# Patient Record
Sex: FEMALE | Employment: UNEMPLOYED | URBAN - METROPOLITAN AREA
[De-identification: names, ages, dates, MRNs, and addresses within clinical notes are randomized per-mention and may not be internally consistent; named-entity substitution may affect disease eponyms.]

---

## 2017-07-07 LAB — PAP-ABSTRACT: NORMAL

## 2018-11-07 ENCOUNTER — OFFICE VISIT (OUTPATIENT)
Dept: FAMILY MEDICINE | Facility: CLINIC | Age: 28
End: 2018-11-07
Payer: COMMERCIAL

## 2018-11-07 VITALS
HEART RATE: 80 BPM | SYSTOLIC BLOOD PRESSURE: 124 MMHG | TEMPERATURE: 98 F | DIASTOLIC BLOOD PRESSURE: 72 MMHG | HEIGHT: 61 IN | WEIGHT: 168 LBS | BODY MASS INDEX: 31.72 KG/M2 | RESPIRATION RATE: 18 BRPM

## 2018-11-07 DIAGNOSIS — F43.23 ADJUSTMENT DISORDER WITH MIXED ANXIETY AND DEPRESSED MOOD: ICD-10-CM

## 2018-11-07 DIAGNOSIS — Z30.41 ENCOUNTER FOR SURVEILLANCE OF CONTRACEPTIVE PILLS: ICD-10-CM

## 2018-11-07 DIAGNOSIS — Z00.00 ANNUAL PHYSICAL EXAM: Primary | ICD-10-CM

## 2018-11-07 PROCEDURE — 99395 PREV VISIT EST AGE 18-39: CPT | Performed by: NURSE PRACTITIONER

## 2018-11-07 RX ORDER — DESOGESTREL AND ETHINYL ESTRADIOL 0.15-0.03
1 KIT ORAL DAILY
COMMUNITY
End: 2018-11-07

## 2018-11-07 RX ORDER — DESOGESTREL AND ETHINYL ESTRADIOL 0.15-0.03
1 KIT ORAL DAILY
Qty: 84 TABLET | Refills: 3 | Status: SHIPPED | OUTPATIENT
Start: 2018-11-07 | End: 2019-10-11

## 2018-11-07 RX ORDER — FLUOXETINE 20 MG/1
20 TABLET, FILM COATED ORAL DAILY
COMMUNITY
End: 2019-03-22

## 2018-11-07 RX ORDER — ARIPIPRAZOLE 5 MG/1
5 TABLET ORAL DAILY
COMMUNITY
End: 2019-03-22

## 2018-11-07 ASSESSMENT — ENCOUNTER SYMPTOMS
DIZZINESS: 0
COUGH: 1
ABDOMINAL PAIN: 1
CONSTIPATION: 0
DIARRHEA: 0
HEMATURIA: 0
EYE PAIN: 0
CHILLS: 0
HEMATOCHEZIA: 1

## 2018-11-07 NOTE — NURSING NOTE
"Chief Complaint   Patient presents with     Physical       Initial /72 (BP Location: Right arm, Cuff Size: Adult Regular)  Pulse 80  Temp 98  F (36.7  C) (Tympanic)  Resp 18  Ht 5' 1\" (1.549 m)  Wt 168 lb (76.2 kg)  LMP 10/31/2018  BMI 31.74 kg/m2 Estimated body mass index is 31.74 kg/(m^2) as calculated from the following:    Height as of this encounter: 5' 1\" (1.549 m).    Weight as of this encounter: 168 lb (76.2 kg).    Patient presents to the clinic using No DME    Health Maintenance that is potentially due pending provider review:  Pap Smear    Pap done at [facility] on 7/7/17- abstracted.    Is there anyone who you would like to be able to receive your results? No  If yes have patient fill out RONALDO    "

## 2018-11-07 NOTE — MR AVS SNAPSHOT
After Visit Summary   11/7/2018    Danyell Orr    MRN: [id number]           Patient Information     Date Of Birth          1990        Visit Information        Provider Department      11/7/2018 10:40 AM Raissa Garibay APRN [facility]        Today's Diagnoses     Annual physical exam    -  1    Encounter for surveillance of contraceptive pills        Adjustment disorder with mixed anxiety and depressed mood          Care Instructions      Preventive Health Recommendations  Female Ages 26 - 39  Yearly exam:   See your health care provider every year in order to    Review health changes.     Discuss preventive care.      Review your medicines if you your doctor has prescribed any.    Until age 30: Get a Pap test every three years (more often if you have had an abnormal result).    After age 30: Talk to your doctor about whether you should have a Pap test every 3 years or have a Pap test with HPV screening every 5 years.   You do not need a Pap test if your uterus was removed (hysterectomy) and you have not had cancer.  You should be tested each year for STDs (sexually transmitted diseases), if you're at risk.   Talk to your provider about how often to have your cholesterol checked.  If you are at risk for diabetes, you should have a diabetes test (fasting glucose).  Shots: Get a flu shot each year. Get a tetanus shot every 10 years.   Nutrition:     Eat at least 5 servings of fruits and vegetables each day.    Eat whole-grain bread, whole-wheat pasta and brown rice instead of white grains and rice.    Get adequate Calcium and Vitamin D.     Lifestyle    Exercise at least 150 minutes a week (30 minutes a day, 5 days of the week). This will help you control your weight and prevent disease.    Limit alcohol to one drink per day.    No smoking.     Wear sunscreen to prevent skin cancer.    See your dentist every six months for an exam and cleaning.            Follow-ups  "after your visit        Additional Services     MENTAL HEALTH REFERRAL  - Child/Adolescent; Psychiatry and Medication Management; Psychiatry; [facility]: Psychiatry Clinic   [phone]; We will contact you to schedule the appointment or please call with any questions       All scheduling is subject to the client's specific insurance plan & benefits, provider/location availability, and provider clinical specialities.  Please arrive 15 minutes early for your first appointment and bring your completed paperwork.    Please be aware that coverage of these services is subject to the terms and limitations of your health insurance plan.  Call member services at your health plan with any benefit or coverage questions.                            Who to contact     If you have questions or need follow up information about today's clinic visit or your schedule please contact [facility] directly at [phone].  Normal or non-critical lab and imaging results will be communicated to you by [employer], letter or phone within 4 business days after the clinic has received the results. If you do not hear from us within 7 days, please contact the clinic through Dakwakh[employer] or phone. If you have a critical or abnormal lab result, we will notify you by phone as soon as possible.  Submit refill requests through [employer] or call your pharmacy and they will forward the refill request to us. Please allow 3 business days for your refill to be completed.          Additional Information About Your Visit        [employer] Information     [employer] lets you send messages to your doctor, view your test results, renew your prescriptions, schedule appointments and more. To sign up, go to [email or web address] . Click on \"Log in\" on the left side of the screen, which will take you to the Welcome page. Then click on \"Sign up Now\" on the right side of the page.     You will be asked to enter the access code listed below, as well as some " "personal information. Please follow the directions to create your username and password.     Your access code is: [email or web address]  Expires: 2019  9:23 AM     Your access code will  in 90 days. If you need help or a new code, please call your [facility] clinic or [phone].        Care EveryWhere ID     This is your Care EveryWhere ID. This could be used by other organizations to access your [facility] medical records  [id number]        Your Vitals Were     Pulse Temperature Respirations Height Last Period BMI (Body Mass Index)    80 98  F (36.7  C) (Tympanic) 18 5' 1\" (1.549 m) 10/31/2018 31.74 kg/m2       Blood Pressure from Last 3 Encounters:   18 124/72    Weight from Last 3 Encounters:   18 168 lb (76.2 kg)              We Performed the Following     ABSTRACT PAP-NO CHARGE     MENTAL HEALTH REFERRAL  - Child/Adolescent; Psychiatry and Medication Management; Psychiatry; [facility]: Psychiatry Clinic   [phone]; We will contact you to schedule the appointment or please call with any questions          Where to get your medicines      These medications were sent to [facility] [place], [place] - [address]  [address] [address]     Phone:  [phone]     desogestrel-ethinyl estradiol 0.15-30 MG-MCG per tablet          Primary Care Provider Office Phone # Fax #    Raissa ZBIGNIEW [facility] [phone] [address] [place] [address]        Equal Access to Services     OZZY CONNER : Hadii emil ham hadasho Sodebbieali, waaxda luqadaha, qaybta kaalmada adeegyavelma, radha murillo. So [facility] [phone].    ATENCIÓN: Si habla español, tiene a hale disposición servicios gratuitos de asistencia lingüística. Llame al [phone].    We comply with applicable federal civil rights laws and [place] laws. We do not discriminate on the basis of race, color, national origin, age, disability, sex, sexual orientation, or gender " identity.            Thank you!     Thank you for choosing [facility]  for your care. Our goal is always to provide you with excellent care. Hearing back from our patients is one way we can continue to improve our services. Please take a few minutes to complete the written survey that you may receive in the mail after your visit with us. Thank you!             Your Updated Medication List - Protect others around you: Learn how to safely use, store and throw away your medicines at www.disposemymeds.org.          This list is accurate as of 11/7/18 11:04 AM.  Always use your most recent med list.                   Brand Name Dispense Instructions for use Diagnosis    AMPHETAMINE SALT COMBO PO      Take 20 mg by mouth daily Take two tablets daily        ARIPiprazole 5 MG tablet    ABILIFY     Take 5 mg by mouth daily        desogestrel-ethinyl estradiol 0.15-30 MG-MCG per tablet    JULEBER    84 tablet    Take 1 tablet by mouth daily    Encounter for surveillance of contraceptive pills       FLUoxetine 20 MG tablet      Take 20 mg by mouth daily

## 2018-11-07 NOTE — PROGRESS NOTES
SUBJECTIVE:   CC: Danyell Orr is an 28 year old woman who presents for preventive health visit.     Physical   Annual:     Getting at least 3 servings of Calcium per day:  NO    Bi-annual eye exam:  NO    Dental care twice a year:  Yes    Sleep apnea or symptoms of sleep apnea:  None    Diet:  Regular (no restrictions)    Frequency of exercise:  1 day/week    Duration of exercise:  Less than 15 minutes    Taking medications regularly:  Yes    Medication side effects:  None    Additional concerns today:  No      Today's PHQ-2 Score:   PHQ-2 ( 1999 [employer]) 11/7/2018   Q1: Little interest or pleasure in doing things 0   Q2: Feeling down, depressed or hopeless 0   PHQ-2 Score 0   Q1: Little interest or pleasure in doing things Not at all   Q2: Feeling down, depressed or hopeless Not at all   PHQ-2 Score 0       Abuse: Current or Past(Physical, Sexual or Emotional)- No  Do you feel safe in your environment - Yes    Social History   Substance Use Topics     Smoking status: Not on file     Smokeless tobacco: Not on file     Alcohol use Not on file     Alcohol Use 11/7/2018   If you drink alcohol do you typically have greater than 3 drinks per day OR greater than 7 drinks per week? No   No flowsheet data found.    Reviewed orders with patient.  Reviewed health maintenance and updated orders accordingly - Yes  BP Readings from Last 3 Encounters:   11/07/18 124/72    Wt Readings from Last 3 Encounters:   11/07/18 168 lb (76.2 kg)                  There is no problem list on file for this patient.    History reviewed. No pertinent surgical history.    Social History   Substance Use Topics     Smoking status: Never Smoker     Smokeless tobacco: Never Used     Alcohol use No     Family History   Problem Relation Age of Onset     HEART DISEASE Mother      Myocardial Infarction Father          Current Outpatient Prescriptions   Medication Sig Dispense Refill     Amphetamine-Dextroamphetamine (AMPHETAMINE SALT COMBO PO) Take  "20 mg by mouth daily Take two tablets daily       ARIPiprazole (ABILIFY) 5 MG tablet Take 5 mg by mouth daily       desogestrel-ethinyl estradiol (JULEBER) 0.15-30 MG-MCG per tablet Take 1 tablet by mouth daily       FLUoxetine 20 MG tablet Take 20 mg by mouth daily       No Known Allergies    Mammogram not appropriate for this patient based on age.    Pertinent mammograms are reviewed under the imaging tab.  History of abnormal Pap smear: NO - age 30- 65 PAP every 3 years recommended     Reviewed and updated as needed this visit by clinical staff         Reviewed and updated as needed this visit by Provider        History reviewed. No pertinent past medical history.   History reviewed. No pertinent surgical history.  Obstetric History     No data available          Review of Systems   Constitutional: Negative for chills.   HENT: Positive for congestion. Negative for ear pain.    Eyes: Negative for pain.   Respiratory: Positive for cough.    Cardiovascular: Negative for chest pain.   Gastrointestinal: Positive for abdominal pain and hematochezia. Negative for constipation and diarrhea.   Genitourinary: Negative for hematuria.   Neurological: Negative for dizziness.        OBJECTIVE:   /72 (BP Location: Right arm, Cuff Size: Adult Regular)  Pulse 80  Temp 98  F (36.7  C) (Tympanic)  Resp 18  Ht 5' 1\" (1.549 m)  Wt 168 lb (76.2 kg)  LMP 10/31/2018  BMI 31.74 kg/m2  Physical Exam   Constitutional: She is oriented to person, place, and time. She appears well-developed and well-nourished.   HENT:   Head: Normocephalic.   Right Ear: External ear normal.   Left Ear: External ear normal.   Eyes: Conjunctivae are normal. Pupils are equal, round, and reactive to light.   Neck: Normal range of motion. Neck supple.   Cardiovascular: Normal rate, regular rhythm and normal heart sounds.    No murmur heard.  Pulmonary/Chest: Effort normal and breath sounds normal.   Abdominal: Soft. Bowel sounds are normal. " "  Musculoskeletal: Normal range of motion. She exhibits no edema.   Neurological: She is alert and oriented to person, place, and time. She has normal reflexes. No cranial nerve deficit.   Skin: Skin is warm and dry. No erythema.   Psychiatric: She has a normal mood and affect. Her behavior is normal. Judgment and thought content normal.     BREAST: normal without masses, tenderness or nipple discharge and no palpable axillary masses or adenopathy      ASSESSMENT/PLAN:   (Z00.00) Annual physical exam  (primary encounter diagnosis)  Comment:   Plan: Completed today    (Z30.41) Encounter for surveillance of contraceptive pills  Comment: Refilled today  Plan: desogestrel-ethinyl estradiol (JULEBER) 0.15-30        MG-MCG per tablet            (F43.23) Adjustment disorder with mixed anxiety and depressed mood  Comment: Patient will be switching over to [place] would like to see psychiatrist in the [facility] system referral has been made  Plan: MENTAL HEALTH REFERRAL  - Child/Adolescent;         Psychiatry and Medication Management;         Psychiatry; [facility]: Psychiatry Clinic - [phone]; We will contact you to schedule the         appointment or please call with any questions                COUNSELING:  Reviewed preventive health counseling, as reflected in patient instructions       Regular exercise       Healthy diet/nutrition       Vision screening       Hearing screening       Contraception       Family planning       Folic Acid Counseling       Safe sex practices/STD prevention    BP Readings from Last 1 Encounters:   No data found for BP     Estimated body mass index is 31.74 kg/(m^2) as calculated from the following:    Height as of this encounter: 5' 1\" (1.549 m).    Weight as of this encounter: 168 lb (76.2 kg).      Weight management plan: Discussed healthy diet and exercise guidelines and patient will follow up in 3 months in clinic to re-evaluate.     has no tobacco history on " file.      Counseling Resources:  ATP IV Guidelines  Pooled Cohorts Equation Calculator  Breast Cancer Risk Calculator  FRAX Risk Assessment  ICSI Preventive Guidelines  Dietary Guidelines for Americans, 2010  USDA's MyPlate  ASA Prophylaxis  Lung CA Screening    ZBIGNIEW Yañez CNP  [facility]

## 2018-12-06 ENCOUNTER — TELEPHONE (OUTPATIENT)
Dept: PSYCHIATRY | Facility: CLINIC | Age: 28
End: 2018-12-06

## 2018-12-06 NOTE — TELEPHONE ENCOUNTER
PSYCHIATRY CLINIC PHONE INTAKE     SERVICES REQUESTED / INTERESTED IN          Med Management    Presenting Problem and Brief History                              What would you like to be seen for? (brief description):  Patient feels down, useless, and sad. She was diagnosed at about age 11 but increased when her father passed away when she was 13 years old. Patient takes Adderall for symptoms related to autism.  Have you received a mental health diagnosis? Yes   Which one (s): Adjustment disorder with anxiety and depression  Is there any history of developmental delay?  No   Are you currently seeing a mental health provider?  Yes            Who / month last seen:  Waylon Valentin (past provider) and Ann Joseph (current) at [facility]. Current provider will be retiring so looking for new provider.  Do you have mental health records elsewhere?  Yes  Will you sign a release so we can obtain them?  Yes    Have you ever been hospitalized for psychiatric reasons?  No  Describe:      Do you have current thoughts of self-harm?  Yes  - sometimes  Do you currently have thoughts of harming others?  No       Substance Use History     Do you have any history of alcohol / illicit drug use?  No  Describe:    Have you ever received treatment for this?  No    Describe:       Social History     Does the patient have a guardian?  No    Name / number:   Have you had an ACT team in last 12 months?  No  Describe:  Do you have any current or past legal issues?  No  Describe:    OK to leave a detailed voicemail?  Yes    Medical/ Surgical History                                 There is no problem list on file for this patient.         Medications             Current Outpatient Prescriptions   Medication Sig Dispense Refill     Amphetamine-Dextroamphetamine (AMPHETAMINE SALT COMBO PO) Take 20 mg by mouth daily Take two tablets daily       ARIPiprazole (ABILIFY) 5 MG tablet Take 5 mg by mouth daily       desogestrel-ethinyl  estradiol (JULEBER) 0.15-30 MG-MCG per tablet Take 1 tablet by mouth daily 84 tablet 3     FLUoxetine 20 MG tablet Take 20 mg by mouth daily           DISPOSITION      Completed phone screen with patient. Scheduled with Viktor Ordaz CNP for GET team.    Kathe Cooper,

## 2018-12-07 DIAGNOSIS — F33.42 MAJOR DEPRESSIVE DISORDER, RECURRENT EPISODE, IN FULL REMISSION (H): Primary | ICD-10-CM

## 2018-12-07 LAB
GLUCOSE SERPL-MCNC: 98 MG/DL (ref 70–99)
HBA1C MFR BLD: 5.4 % (ref 0–5.6)

## 2018-12-07 PROCEDURE — 83036 HEMOGLOBIN GLYCOSYLATED A1C: CPT | Performed by: REGISTERED NURSE

## 2018-12-07 PROCEDURE — 82947 ASSAY GLUCOSE BLOOD QUANT: CPT | Performed by: REGISTERED NURSE

## 2018-12-07 PROCEDURE — 36415 COLL VENOUS BLD VENIPUNCTURE: CPT | Performed by: REGISTERED NURSE

## 2019-02-14 ENCOUNTER — OFFICE VISIT (OUTPATIENT)
Dept: PSYCHIATRY | Facility: CLINIC | Age: 29
End: 2019-02-14
Attending: NURSE PRACTITIONER
Payer: COMMERCIAL

## 2019-02-14 ENCOUNTER — TELEPHONE (OUTPATIENT)
Dept: PSYCHIATRY | Facility: CLINIC | Age: 29
End: 2019-02-14

## 2019-02-14 VITALS
BODY MASS INDEX: 30.99 KG/M2 | DIASTOLIC BLOOD PRESSURE: 84 MMHG | SYSTOLIC BLOOD PRESSURE: 130 MMHG | HEART RATE: 75 BPM | WEIGHT: 164 LBS

## 2019-02-14 DIAGNOSIS — F84.0 ACTIVE AUTISTIC DISORDER: Primary | ICD-10-CM

## 2019-02-14 DIAGNOSIS — F43.23 ADJUSTMENT DISORDER WITH MIXED ANXIETY AND DEPRESSED MOOD: ICD-10-CM

## 2019-02-14 PROCEDURE — G0463 HOSPITAL OUTPT CLINIC VISIT: HCPCS | Mod: ZF

## 2019-02-14 RX ORDER — DEXTROAMPHETAMINE SACCHARATE, AMPHETAMINE ASPARTATE MONOHYDRATE, DEXTROAMPHETAMINE SULFATE AND AMPHETAMINE SULFATE 5; 5; 5; 5 MG/1; MG/1; MG/1; MG/1
40 CAPSULE, EXTENDED RELEASE ORAL DAILY
Qty: 60 CAPSULE | Refills: 0 | Status: SHIPPED | OUTPATIENT
Start: 2019-03-04 | End: 2019-03-22

## 2019-02-14 ASSESSMENT — PAIN SCALES - GENERAL: PAINLEVEL: NO PAIN (0)

## 2019-02-14 NOTE — PROGRESS NOTES
"  Psychiatry Clinic Medical Diagnostic Assessment               Danyell Orr is a 28 year old female who presents to the clinic to establish psychiatric care  Therapist: None  PCP: Raissa Garibay  Other Providers: None  Referred by SILVANA Garibay for evaluation of depression and attentional problems.      History was provided by patient who was a fair historian.     Chief Complaint                                                                                                             \" previous provider retiring and need a new one \"     History of Present Illness                                                                                 4, 4      Psych critical item history includes trauma hx .     Most recent history:  Danyell has a history of ASD and adjustment disorder.  She does not endorse any concerns with symptoms currently.  Overall her mood is good and she does not endorse any concerns with anxiety.  She states she gave all her stress to God.  Danyell states she is [employer]regation person and her debbie has helped her better manage her anxiety and depression.  Danyell does endorse periodic anxiety when she meets new people as she considers herself socially awkward.  Danyell reports having a close group of 5 friends.  She reports no concern with falling asleep but struggles with staying asleep.  Currently taking Prozac 20mg, Abilify 5mg, and Amphetamine-Dextroamphetamine 40mg daily (prescribed as 20mg BID).  She states the medications are helpful with mood, anxiety, and maintaining focus respectively.  Danyell  Reports does not have an official diagnosis of ADHD.  Takes 40mg Adderall XR in morning instead of twice per day as previously prescribed.   She is currently not working but she is taking care of her mother who has medical issues.      Pertinent Background:  Danyell reports she started taking psychiatric medications at age 10-11.  Father  of heart attack when she was 14 which impacted her significantly.  She " "witnessed the incident.  She continues to revisit the death and became tearful when discussed today.    Also at this time, Danyell was having episodes where she would \"be inconsolable, cry, and scream.\"   She also remembers numerous medication trials but unable to remember specific medications.  She does not remember being hospitalized for psychiatric reasons.  No suicide attempts.  She does recall having passive suicidal ideation while in high school.  She was treated for concussion at 9 years old.  No history of seizures.  No history of psychosis or jose.        Recent Symptoms:   Depression:  not endorsed  Elevated:  none  Psychosis:  none  Anxiety:  social anxiety  Panic Attack:  none  Trauma Related:  intrusive memories       Recent Substance Use:  Alcohol- no , Tobacco- no , Caffeine- coffee/ tea [1 cup], Opioids- no    Narcan Kit- N/A , Cannabis- no  and Other Illicit Drugs-none     Substance Use History                                                                 None        Psychiatric History     Suicidal ideation      Psychiatric Medication Trials     Patient reports numerous trials but unable to recall                                                       OR this and delete the other    Drug /  Start Date Dose (mg) Helpful Adverse Effects   DC Reason / Date                          Social/ Family History               [per patient report]                                                  1ea, 1ea     FINANCIAL SUPPORT- not working.  Has not worked since graduating from high school    CHILDREN- None       LIVING SITUATION- Lives with mother and stepfather in [place], [place]      LEGAL- None  EARLY HISTORY/ EDUCATION- Grew up in [place], [place].  Graduated from high school.  Received special education accommodations while in high school  SOCIAL/ SPIRITUAL SUPPORT- close friends and brother       TRAUMA HISTORY (self-report)- None  FEELS SAFE AT HOME- Yes  FAMILY HISTORY-  unknown    Medical / Surgical History          "                                                                                                          There is no problem list on file for this patient.      No past surgical history on file.     Medical Review of Systems                                                                                                     2, 10     Pregnant- No    Breastfeeding- No    Contraception- Yes  A comprehensive review of systems was performed and is negative other than noted in the HPI.    Allergy                                Patient has no known allergies.  Current Medications                                                                                                         Current Outpatient Medications   Medication Sig Dispense Refill     Amphetamine-Dextroamphetamine (AMPHETAMINE SALT COMBO PO) Take 20 mg by mouth daily Take two tablets daily       ARIPiprazole (ABILIFY) 5 MG tablet Take 5 mg by mouth daily       desogestrel-ethinyl estradiol (JULEBER) 0.15-30 MG-MCG per tablet Take 1 tablet by mouth daily 84 tablet 3     FLUoxetine 20 MG tablet Take 20 mg by mouth daily       Vitals                                                                                                                         3, 3     /84   Pulse 75   Wt 74.4 kg (164 lb)   BMI 30.99 kg/m       Mental Status Exam                                                                                      9, 14 cog gs     Alertness: alert  and oriented  Appearance: casually groomed  Behavior/Demeanor: cooperative, pleasant and calm, with fair  eye contact   Speech: regular rate and rhythm  Language: intact  Psychomotor: normal or unremarkable  Mood: description consistent with euthymia  Affect: appropriate; was congruent to mood; was congruent to content  Thought Process/Associations: unremarkable  Thought Content:  Reports none;  Denies suicidal ideation and violent ideation  Perception:  Reports none;  Denies auditory hallucinations  and visual hallucinations  Insight: adequate  Judgment: adequate for safety  Cognition: (6) does  appear grossly intact; formal cognitive testing was not done  Gait and Station: unremarkable    Labs and Data                                                                                                                     Rating Scales:   PHQ9 and CAGE AIDE 1. Have you ever felt that you outght to cut down on your drinking or drug use?  no  2. Have people annoyed you by criticizing your drinking or drug use? no  3. Have you ever felt bad or guilty about your drinking or drug use?  no  4. Have you ever had a drink or used drugs first thing in the morning to steady your nerves or to get rid of a hangover?  no    PHQ9 Today:  0  No flowsheet data found.      No lab results found.  No lab results found.    Diagnosis and Assessment                                                                             m2, h3     Today the following issues were addressed:    1) Major Depressive Disorder, recurrent, full remission  2) Autism Spectrum Disorder (Hx)    [place] Prescription Monitoring Program [] was checked today:  indicates Danyell has been regularly filling Adderall XR 20mg #60 for past 12 months.        Plan                                                                                                                     m2, h3     1) Medication Management  Continue Adderall XR 40mg daily  Continue Abilify 5mg daily  Continue Prozac 20mg daily    RTC: 1 month    CRISIS NUMBERS:   Provided routinely in AVS.    Treatment Risk Statement:  The patient understands the risks, benefits, adverse effects and alternatives. Agrees to treatment with the capacity to do so. No medical contraindications to treatment. Agrees to call clinic for any problems. The patient understands to call 911 or go to the nearest ED if life threatening or urgent symptoms occur.     WHODAS 2.0  TODAY total score = N/A; [a 12-item WHODAS 2.0 assessment was  not completed by the pt today and/or recorded in EPIC].     PROVIDER:  ZBIGNIEW Murphy CNP

## 2019-02-14 NOTE — PATIENT INSTRUCTIONS
Call clinic if refills are needed.        Thank you for coming to the PSYCHIATRY CLINIC.    Lab Testing:  If you had lab testing today and your results are reassuring or normal they will be mailed to you or sent through [facility] within 7 days.   If the lab tests need quick action we will call you with the results.  The phone number we will call with results is # [phone] (home) . If this is not the best number please call our clinic and change the number.    Medication Refills:  If you need any refills please call your pharmacy and they will contact us. Our fax number for refills is [phone]. Please allow three business for refill processing.   If you need to  your refill at a new pharmacy, please contact the new pharmacy directly. The new pharmacy will help you get your medications transferred.     Scheduling:  If you have any concerns about today's visit or wish to schedule another appointment please call our office during normal business hours [phone] (8-5:00 M-F)    Contact Us:  Please call [phone] during business hours (8-5:00 M-F).  If after clinic hours, or on the weekend, please call  [phone].    Financial Assistance [phone]  [email or web address] Billing [phone]  [facility] Billing [phone]  Medical Records [phone]      MENTAL HEALTH CRISIS NUMBERS:  [facility]:   [facility] - [phone]   Crisis Residence [facility] - [phone]   Walk-In Counseling [facility] [phone]   COPE 24/7 [place] Mobile Team for Adults - [[phone]]; Child - [[phone]]     Crisis Connection - [phone]     [facility] - [phone]   Walk-in counseling [facility] - [phone]   Walk-in counseling [facility] - [phone]   Crisis Residence [facility] - [phone]   Urgent Care Adult Mental Health:   --Drop-in, 24/7 crisis line, and [place]  Mobile Team [[phone]]    CRISIS TEXT LINE: Text [phone] from anywhere, anytime, any crisis 24/7;    OR SEE [email or web address]     Poison Control Center - [phone]    CHILD: [facility] needs assessment team - [phone]     [facility] Lifeline - [phone]; or [facility] Lifeline - [phone]    If you have a medical emergency please call 911or go to the nearest ER.                    _____________________________________________    Again thank you for choosing PSYCHIATRY CLINIC and please let us know how we can best partner with you to improve you and your family's health.  You may be receiving a survey in the mail regarding this appointment. We would love to have your feedback, both positive and negative, so please fill out the survey and return it using the provided envelope. The survey is done by an external company, so your answers are anonymous.

## 2019-02-15 NOTE — TELEPHONE ENCOUNTER
On 2/14/2019 the patient signed an RONALDO authorizing medical records to be released from [facility] to [facility]  for the purpose of continuing care. I faxed the request to [phone]. I sent the original to RONALDO to scanning and kept a copy in psychiatry until scanning is complete.  Marcella Storey, CMA

## 2019-03-13 ASSESSMENT — PATIENT HEALTH QUESTIONNAIRE - PHQ9: SUM OF ALL RESPONSES TO PHQ QUESTIONS 1-9: 0

## 2019-03-22 ENCOUNTER — OFFICE VISIT (OUTPATIENT)
Dept: PSYCHIATRY | Facility: CLINIC | Age: 29
End: 2019-03-22
Attending: NURSE PRACTITIONER
Payer: COMMERCIAL

## 2019-03-22 VITALS
BODY MASS INDEX: 31.91 KG/M2 | DIASTOLIC BLOOD PRESSURE: 84 MMHG | WEIGHT: 168.9 LBS | SYSTOLIC BLOOD PRESSURE: 135 MMHG | HEART RATE: 86 BPM

## 2019-03-22 DIAGNOSIS — F84.0 ACTIVE AUTISTIC DISORDER: ICD-10-CM

## 2019-03-22 DIAGNOSIS — F32.5 MAJOR DEPRESSION IN COMPLETE REMISSION (H): Primary | ICD-10-CM

## 2019-03-22 PROCEDURE — G0463 HOSPITAL OUTPT CLINIC VISIT: HCPCS | Mod: ZF

## 2019-03-22 RX ORDER — DEXTROAMPHETAMINE SACCHARATE, AMPHETAMINE ASPARTATE MONOHYDRATE, DEXTROAMPHETAMINE SULFATE AND AMPHETAMINE SULFATE 5; 5; 5; 5 MG/1; MG/1; MG/1; MG/1
40 CAPSULE, EXTENDED RELEASE ORAL DAILY
Qty: 60 CAPSULE | Refills: 0 | Status: SHIPPED | OUTPATIENT
Start: 2019-04-22 | End: 2019-06-14

## 2019-03-22 RX ORDER — DEXTROAMPHETAMINE SACCHARATE, AMPHETAMINE ASPARTATE MONOHYDRATE, DEXTROAMPHETAMINE SULFATE AND AMPHETAMINE SULFATE 5; 5; 5; 5 MG/1; MG/1; MG/1; MG/1
40 CAPSULE, EXTENDED RELEASE ORAL DAILY
Qty: 60 CAPSULE | Refills: 0 | Status: SHIPPED | OUTPATIENT
Start: 2019-05-22 | End: 2019-06-14

## 2019-03-22 RX ORDER — ARIPIPRAZOLE 5 MG/1
5 TABLET ORAL DAILY
Qty: 90 TABLET | Refills: 0 | Status: SHIPPED | OUTPATIENT
Start: 2019-03-22 | End: 2019-06-14

## 2019-03-22 ASSESSMENT — PAIN SCALES - GENERAL: PAINLEVEL: NO PAIN (0)

## 2019-03-22 ASSESSMENT — PATIENT HEALTH QUESTIONNAIRE - PHQ9: SUM OF ALL RESPONSES TO PHQ QUESTIONS 1-9: 4

## 2019-03-22 NOTE — PATIENT INSTRUCTIONS
Thank you for coming to the PSYCHIATRY CLINIC.    Lab Testing:  If you had lab testing today and your results are reassuring or normal they will be mailed to you or sent through [facility] within 7 days.   If the lab tests need quick action we will call you with the results.  The phone number we will call with results is # [phone] (home) . If this is not the best number please call our clinic and change the number.    Medication Refills:  If you need any refills please call your pharmacy and they will contact us. Our fax number for refills is [phone]. Please allow three business for refill processing.   If you need to  your refill at a new pharmacy, please contact the new pharmacy directly. The new pharmacy will help you get your medications transferred.     Scheduling:  If you have any concerns about today's visit or wish to schedule another appointment please call our office during normal business hours [phone] (8-5:00 M-F)    Contact Us:  Please call [phone] during business hours (8-5:00 M-F).  If after clinic hours, or on the weekend, please call  [phone].    Financial Assistance [phone]  [facility] Billing [phone]  [facility] Billing [phone]  Medical Records [phone]      MENTAL HEALTH CRISIS NUMBERS:  [facility]:   [facility] - [phone]   Crisis Residence [facility] - [phone]   Walk-In Counseling [facility] [phone]   COPE 24/7 [place] Mobile Team for Adults - [[phone]]; Child - [[phone]]        [facility]:   [facility] - [phone]   Walk-in counseling [facility] - [phone]   Walk-in counseling [facility] - [phone]   Crisis Residence [facility] - [phone]   Urgent Care Adult Mental Health:   --Drop-in, 24/7 crisis line, and [place] Mobile Team [[phone]]    CRISIS TEXT LINE: Text [phone] from anywhere,  anytime, any crisis 24/7;    OR SEE [email or web address]     Poison Control Center - [phone]    CHILD: [facility] needs assessment team - [phone]     [facility] - [phone]; or [facility] - [phone]    If you have a medical emergency please call 911or go to the nearest ER.                    _____________________________________________    Again thank you for choosing PSYCHIATRY CLINIC and please let us know how we can best partner with you to improve you and your family's health.  You may be receiving a survey in the mail regarding this appointment. We would love to have your feedback, both positive and negative, so please fill out the survey and return it using the provided envelope. The survey is done by an external company, so your answers are anonymous.

## 2019-03-22 NOTE — PROGRESS NOTES
"  Psychiatry Clinic Progress Note                                                                   Danyell Orr is a 28 year old female who returns to the clinic for follow-up care  Therapist: None  PCP: Raissa Garibay  Other Providers: None    Pertinent Background:  See previous notes.  Psych critical item history includes trauma hx.     Interim History                                                                                                        4, 4     The patient is a good historian, reports good treatment adherence and was last seen 2/14/19.  Since the last visit, Danyell reports she is doing \"good.\"  No significant changes since last visit.  Her brother, Onesimo, moved to [place] and she reports she is managing change.  She does report difficulty staying asleep and is probably attributable to her taking Prozac and Abilify at bedtime.  Advised her to take in the morning.      Recent Symptoms:   Depression:  low energy, insomnia and appetite changes  Elevated:  none  Psychosis:  none  Anxiety:  not endorsed  Panic Attack:  none  Trauma Related:  none     Recent Substance Use:  Alcohol- no , Tobacco- no , Caffeine- coffee/ tea [1 cup], Opioids- no    Narcan Kit- N/A , Cannabis- no  and Other Illicit Drugs-none        Social/ Family History                                  [per patient report]                                 1ea,1ea   FINANCIAL SUPPORT- not working.  Has not worked since graduating from high school    CHILDREN- None       LIVING SITUATION- Lives with mother and stepfather in [place], [place]      LEGAL- None  EARLY HISTORY/ EDUCATION- Grew up in [place], [place].  Graduated from high school.  Received special education accommodations while in high school  SOCIAL/ SPIRITUAL SUPPORT- close friends and brother       TRAUMA HISTORY (self-report)- None  FEELS SAFE AT HOME- Yes  FAMILY HISTORY-  unknown    Medical / Surgical History                                                                               "                                  There is no problem list on file for this patient.      No past surgical history on file.     Medical Review of Systems                                                                                                    2,10   The remainder of the review of systems is noncontributory  Allergy                                Patient has no known allergies.  Current Medications                                                                                                       Current Outpatient Medications   Medication Sig Dispense Refill     amphetamine-dextroamphetamine (ADDERALL XR) 20 MG 24 hr capsule Take 2 capsules (40 mg) by mouth daily 60 capsule 0     Amphetamine-Dextroamphetamine (AMPHETAMINE SALT COMBO PO) Take 20 mg by mouth daily Take two tablets daily       ARIPiprazole (ABILIFY) 5 MG tablet Take 5 mg by mouth daily       desogestrel-ethinyl estradiol (JULEBER) 0.15-30 MG-MCG per tablet Take 1 tablet by mouth daily 84 tablet 3     FLUoxetine 20 MG tablet Take 20 mg by mouth daily       Vitals                                                                                                                       3, 3   /84   Pulse 86   Wt 76.6 kg (168 lb 14.4 oz)   BMI 31.91 kg/m     Mental Status Exam                                                                                    9, 14 cog gs     Alertness: alert  and oriented  Appearance: casually groomed  Behavior/Demeanor: cooperative, pleasant and calm, with fair  eye contact   Speech: regular rate and rhythm  Language: intact  Psychomotor: normal or unremarkable  Mood: description consistent with euthymia  Affect: appropriate; was congruent to mood; was congruent to content  Thought Process/Associations: unremarkable  Thought Content:  Reports none;  Denies suicidal ideation and violent ideation  Perception:  Reports none;  Denies auditory hallucinations and visual hallucinations  Insight:  adequate  Judgment: good  Cognition: (6) does  appear grossly intact; formal cognitive testing was not done  Gait/Station and/or Muscle Strength/Tone: unremarkable    Labs and Data                                                                                                                 Rating Scales:    PHQ9    PHQ9 Today:  5  PHQ-9 SCORE 2/14/2019   PHQ-9 Total Score 0         Diagnosis and Assessment                                                                             m2, h3     Today the following issues were addressed:    1) Major Depressive Disorder, recurrent, full remission  2) Autism Spectrum Disorder (Hx)     [place] Pres[place] Monitoring [place] [] was checked today:  indicates Danyell has been regularly filling Adderall XR 20mg #60 for past 12 months.       Plan                                                                                                                    m2, h3      1) Medication Management  Continue Adderall XR 40mg daily  Continue Abilify 5mg daily  Continue Prozac 20mg daily    Danyell was taking medications at bedtime.  Advised to take in morning to help with sleep     RTC: 3 months  CRISIS NUMBERS:   Provided routinely in AVS.    Treatment Risk Statement:  The patient understands the risks, benefits, adverse effects and alternatives. Agrees to treatment with the capacity to do so. No medical contraindications to treatment. Agrees to call clinic for any problems. The patient understands to call 911 or go to the nearest ED if life threatening or urgent symptoms occur.      PROVIDER:  ZBIGNIEW Murphy CNP

## 2019-04-08 ENCOUNTER — OFFICE VISIT (OUTPATIENT)
Dept: URGENT CARE | Facility: URGENT CARE | Age: 29
End: 2019-04-08
Payer: COMMERCIAL

## 2019-04-08 VITALS
TEMPERATURE: 97.6 F | SYSTOLIC BLOOD PRESSURE: 116 MMHG | DIASTOLIC BLOOD PRESSURE: 56 MMHG | WEIGHT: 171 LBS | BODY MASS INDEX: 32.31 KG/M2 | OXYGEN SATURATION: 99 % | RESPIRATION RATE: 18 BRPM | HEART RATE: 76 BPM

## 2019-04-08 DIAGNOSIS — S09.91XA TRAUMA OF EAR CANAL, INITIAL ENCOUNTER: Primary | ICD-10-CM

## 2019-04-08 PROCEDURE — 99213 OFFICE O/P EST LOW 20 MIN: CPT

## 2019-04-09 NOTE — PROGRESS NOTES
Danyell Orr with presents with 1 days symptoms including some blood on her qtip. Some pain in canal. Mild.     No fever. No uri symptoms .    Treatment measures tried include None tried.  No  Exposure   No  recent travel     Current Outpatient Medications   Medication Sig Dispense Refill     [START ON 4/22/2019] amphetamine-dextroamphetamine (ADDERALL XR) 20 MG 24 hr capsule Take 2 capsules (40 mg) by mouth daily 60 capsule 0     [START ON 5/22/2019] amphetamine-dextroamphetamine (ADDERALL XR) 20 MG 24 hr capsule Take 2 capsules (40 mg) by mouth daily 60 capsule 0     Amphetamine-Dextroamphetamine (AMPHETAMINE SALT COMBO PO) Take 20 mg by mouth daily Take two tablets daily       ARIPiprazole (ABILIFY) 5 MG tablet Take 1 tablet (5 mg) by mouth daily 90 tablet 0     desogestrel-ethinyl estradiol (JULEBER) 0.15-30 MG-MCG per tablet Take 1 tablet by mouth daily 84 tablet 3     FLUoxetine (PROZAC) 20 MG capsule Take 1 capsule (20 mg) by mouth daily 90 capsule 0       ROS otherwise negative for resp., ID,  HEENT symptoms.    Objective: /56 (BP Location: Right arm, Patient Position: Chair, Cuff Size: Adult Regular)   Pulse 76   Temp 97.6  F (36.4  C) (Tympanic)   Resp 18   Wt 77.6 kg (171 lb)   SpO2 99%   BMI 32.31 kg/m    Exam:  GENERAL APPEARANCE: healthy, alert and no distress  EYES: Eyes grossly normal to inspection  HENT: TM's pearly grey, normal light reflex bilateral and external ear canal with an eschar about FPC to tm on the left anteriorly  NECK: no adenopathy, no asymmetry, masses, or scars and thyroid normal to palpation  RESP: lungs clear to auscultation - no rales, rhonchi or wheezes  CV: regular rates and rhythm, normal S1 S2, no S3 or S4 and no murmur, click or rub -       ICD-10-CM    1. Trauma of ear canal, initial encounter S09.91XA      Wound cares discussed.

## 2019-06-14 ENCOUNTER — OFFICE VISIT (OUTPATIENT)
Dept: PSYCHIATRY | Facility: CLINIC | Age: 29
End: 2019-06-14
Attending: NURSE PRACTITIONER
Payer: COMMERCIAL

## 2019-06-14 VITALS
SYSTOLIC BLOOD PRESSURE: 126 MMHG | HEART RATE: 75 BPM | DIASTOLIC BLOOD PRESSURE: 85 MMHG | WEIGHT: 167.6 LBS | BODY MASS INDEX: 31.67 KG/M2

## 2019-06-14 DIAGNOSIS — F84.0 ACTIVE AUTISTIC DISORDER: ICD-10-CM

## 2019-06-14 DIAGNOSIS — F32.5 MAJOR DEPRESSION IN COMPLETE REMISSION (H): ICD-10-CM

## 2019-06-14 PROCEDURE — G0463 HOSPITAL OUTPT CLINIC VISIT: HCPCS | Mod: ZF

## 2019-06-14 RX ORDER — DEXTROAMPHETAMINE SACCHARATE, AMPHETAMINE ASPARTATE MONOHYDRATE, DEXTROAMPHETAMINE SULFATE AND AMPHETAMINE SULFATE 5; 5; 5; 5 MG/1; MG/1; MG/1; MG/1
40 CAPSULE, EXTENDED RELEASE ORAL DAILY
Qty: 60 CAPSULE | Refills: 0 | Status: SHIPPED | OUTPATIENT
Start: 2019-06-14 | End: 2019-08-30

## 2019-06-14 RX ORDER — DEXTROAMPHETAMINE SACCHARATE, AMPHETAMINE ASPARTATE MONOHYDRATE, DEXTROAMPHETAMINE SULFATE AND AMPHETAMINE SULFATE 5; 5; 5; 5 MG/1; MG/1; MG/1; MG/1
20 CAPSULE, EXTENDED RELEASE ORAL 2 TIMES DAILY
Qty: 60 CAPSULE | Refills: 0 | Status: SHIPPED | OUTPATIENT
Start: 2019-07-14 | End: 2019-06-14

## 2019-06-14 RX ORDER — DEXTROAMPHETAMINE SACCHARATE, AMPHETAMINE ASPARTATE MONOHYDRATE, DEXTROAMPHETAMINE SULFATE AND AMPHETAMINE SULFATE 5; 5; 5; 5 MG/1; MG/1; MG/1; MG/1
40 CAPSULE, EXTENDED RELEASE ORAL DAILY
Qty: 60 CAPSULE | Refills: 0 | Status: CANCELLED | OUTPATIENT
Start: 2019-06-14

## 2019-06-14 RX ORDER — DEXTROAMPHETAMINE SACCHARATE, AMPHETAMINE ASPARTATE MONOHYDRATE, DEXTROAMPHETAMINE SULFATE AND AMPHETAMINE SULFATE 5; 5; 5; 5 MG/1; MG/1; MG/1; MG/1
40 CAPSULE, EXTENDED RELEASE ORAL EVERY MORNING
Qty: 60 CAPSULE | Refills: 0 | Status: SHIPPED | OUTPATIENT
Start: 2019-07-14 | End: 2019-08-30

## 2019-06-14 RX ORDER — DEXTROAMPHETAMINE SACCHARATE, AMPHETAMINE ASPARTATE MONOHYDRATE, DEXTROAMPHETAMINE SULFATE AND AMPHETAMINE SULFATE 5; 5; 5; 5 MG/1; MG/1; MG/1; MG/1
40 CAPSULE, EXTENDED RELEASE ORAL EVERY MORNING
Qty: 60 CAPSULE | Refills: 0 | Status: SHIPPED | OUTPATIENT
Start: 2019-08-14 | End: 2019-08-30

## 2019-06-14 RX ORDER — ARIPIPRAZOLE 5 MG/1
5 TABLET ORAL DAILY
Qty: 90 TABLET | Refills: 0 | Status: SHIPPED | OUTPATIENT
Start: 2019-06-14 | End: 2019-08-30

## 2019-06-14 RX ORDER — DEXTROAMPHETAMINE SACCHARATE, AMPHETAMINE ASPARTATE MONOHYDRATE, DEXTROAMPHETAMINE SULFATE AND AMPHETAMINE SULFATE 5; 5; 5; 5 MG/1; MG/1; MG/1; MG/1
20 CAPSULE, EXTENDED RELEASE ORAL 2 TIMES DAILY
Qty: 60 CAPSULE | Refills: 0 | Status: SHIPPED | OUTPATIENT
Start: 2019-08-14 | End: 2019-06-14

## 2019-06-14 ASSESSMENT — PAIN SCALES - GENERAL: PAINLEVEL: NO PAIN (0)

## 2019-06-14 NOTE — PROGRESS NOTES
"  Psychiatry Clinic Progress Note                                                                   Danyell Orr is a 28 year old female who returns to the clinic for follow-up care  Therapist: None  PCP: Raissa Garibay  Other Providers: None    Pertinent Background:  See previous notes.  Psych critical item history includes trauma hx.     Interim History                                                                                                        4, 4     The patient is a good historian, reports good treatment adherence and was last seen 3/22/19.  Since the last visit, Danyell reports she is \"good!\"  Affect quite bright today and talkative.  Again reports no significant changes.  She talks with her brother frequently.  Danyell reports he sleep did improve after changing when she takes her Abilify and Prozac from bedtime to morning.      3/22/19: Danyell reports she is doing \"good.\"  No significant changes since last visit.  Her brother, Onesimo, moved to [place] and she reports she is managing change.  She does report difficulty staying asleep and is probably attributable to her taking Prozac and Abilify at bedtime.  Advised her to take in the morning.      Recent Symptoms:   Depression:  not endorsed  Elevated:  none  Psychosis:  none  Anxiety:  not endorsed  Panic Attack:  none  Trauma Related:  none     Recent Substance Use:  Alcohol- no , Tobacco- no , Caffeine- coffee/ tea [1 cup], Opioids- no    Narcan Kit- N/A , Cannabis- no  and Other Illicit Drugs-none        Social/ Family History                                  [per patient report]                                 1ea,1ea   FINANCIAL SUPPORT- not working.  Has not worked since graduating from high school    CHILDREN- None       LIVING SITUATION- Lives with mother and stepfather in [place], [place]      LEGAL- None  EARLY HISTORY/ EDUCATION- Grew up in [place], [place].  Graduated from high school.  Received special education accommodations while in high " school  SOCIAL/ SPIRITUAL SUPPORT- close friends and brother       TRAUMA HISTORY (self-report)- None  FEELS SAFE AT HOME- Yes  FAMILY HISTORY-  unknown    Medical / Surgical History                                                                                                                There is no problem list on file for this patient.      No past surgical history on file.     Medical Review of Systems                                                                                                    2,10   The remainder of the review of systems is noncontributory  Allergy                                Patient has no known allergies.  Current Medications                                                                                                       Current Outpatient Medications   Medication Sig Dispense Refill     amphetamine-dextroamphetamine (ADDERALL XR) 20 MG 24 hr capsule Take 2 capsules (40 mg) by mouth daily 60 capsule 0     amphetamine-dextroamphetamine (ADDERALL XR) 20 MG 24 hr capsule Take 2 capsules (40 mg) by mouth daily 60 capsule 0     Amphetamine-Dextroamphetamine (AMPHETAMINE SALT COMBO PO) Take 20 mg by mouth daily Take two tablets daily       ARIPiprazole (ABILIFY) 5 MG tablet Take 1 tablet (5 mg) by mouth daily 90 tablet 0     desogestrel-ethinyl estradiol (JULEBER) 0.15-30 MG-MCG per tablet Take 1 tablet by mouth daily 84 tablet 3     FLUoxetine (PROZAC) 20 MG capsule Take 1 capsule (20 mg) by mouth daily 90 capsule 0     Vitals                                                                                                                       3, 3   /85   Pulse 75   Wt 76 kg (167 lb 9.6 oz)   BMI 31.67 kg/m     Mental Status Exam                                                                                    9, 14 cog gs     Alertness: alert  and oriented  Appearance: casually groomed  Behavior/Demeanor: cooperative, pleasant and calm, with fair  eye contact   Speech:  normal  Language: no problems  Psychomotor: normal or unremarkable  Mood: description consistent with euthymia  Affect: appropriate; was congruent to mood; was congruent to content  Thought Process/Associations: unremarkable  Thought Content:  Reports none;  Denies suicidal ideation and violent ideation  Perception:  Reports none;  Denies auditory hallucinations and visual hallucinations  Insight: adequate  Judgment: good  Cognition: (6) does  appear grossly intact; formal cognitive testing was not done  Gait/Station and/or Muscle Strength/Tone: unremarkable    Labs and Data                                                                                                                 Rating Scales:    PHQ9    PHQ9 Today:  1  PHQ-9 SCORE 2/14/2019 3/22/2019   PHQ-9 Total Score 0 4         Diagnosis and Assessment                                                                             m2, h3     Today the following issues were addressed:    1) Major Depressive Disorder, recurrent, full remission  2) Autism Spectrum Disorder (Hx)     [place] Prescription Monitoring Program [] was checked today:  indicates Danyell has been regularly filling Adderall XR 20mg #60 for past 12 months.       Plan                                                                                                                    m2, h3      1) Medication Management  Continue Adderall XR 40mg daily  Continue Abilify 5mg daily  Continue Prozac 20mg daily    Danyell was taking medications at bedtime.  Advised to take in morning to help with sleep     RTC: 3 months  CRISIS NUMBERS:   Provided routinely in AVS.    Treatment Risk Statement:  The patient understands the risks, benefits, adverse effects and alternatives. Agrees to treatment with the capacity to do so. No medical contraindications to treatment. Agrees to call clinic for any problems. The patient understands to call 911 or go to the nearest ED if life threatening or urgent symptoms occur.       PROVIDER:  ZBIGNIEW Murphy CNP

## 2019-06-14 NOTE — PATIENT INSTRUCTIONS
Thank you for coming to the PSYCHIATRY CLINIC.    Lab Testing:  If you had lab testing today and your results are reassuring or normal they will be mailed to you or sent through [employer] within 7 days.   If the lab tests need quick action we will call you with the results.  The phone number we will call with results is # [phone] (home) . If this is not the best number please call our clinic and change the number.    Medication Refills:  If you need any refills please call your pharmacy and they will contact us. Our fax number for refills is [phone]. Please allow three business for refill processing.   If you need to  your refill at a new pharmacy, please contact the new pharmacy directly. The new pharmacy will help you get your medications transferred.     Scheduling:  If you have any concerns about today's visit or wish to schedule another appointment please call our office during normal business hours [phone] (8-5:00 M-F)    Contact Us:  Please call [phone] during business hours (8-5:00 M-F).  If after clinic hours, or on the weekend, please call  [phone].    Financial Assistance [phone]  [facility] Billing [phone]  [facility] Billing [phone]  Medical Records [phone]      MENTAL HEALTH CRISIS NUMBERS:  [facility]:   [facility] - [phone]   Crisis Residence [facility] - [phone]   Walk-In Counseling [facility] [phone]   COPE 24/7 [place] Mobile Team for Adults - [[phone]]; Child - [[phone]]        [facility]:   [facility] - [phone]   Walk-in counseling [facility] - [phone]   Walk-in counseling [facility] - [phone]   Crisis Residence [facility] - [phone]   Urgent Care Adult Mental Health:   --Drop-in, 24/7 crisis line, and [place] Mobile Team [[phone]]    CRISIS TEXT LINE: Text [phone] from anywhere,  anytime, any crisis 24/7;    OR SEE [email or web address]     Poison Control Center - [phone]    CHILD: [facility] needs assessment team - [phone]     [facility] - [phone]; or [facility] - [phone]    If you have a medical emergency please call 911or go to the nearest ER.                    _____________________________________________    Again thank you for choosing PSYCHIATRY CLINIC and please let us know how we can best partner with you to improve you and your family's health.  You may be receiving a survey in the mail regarding this appointment. We would love to have your feedback, both positive and negative, so please fill out the survey and return it using the provided envelope. The survey is done by an external company, so your answers are anonymous.

## 2019-06-17 ASSESSMENT — PATIENT HEALTH QUESTIONNAIRE - PHQ9: SUM OF ALL RESPONSES TO PHQ QUESTIONS 1-9: 1

## 2019-08-30 ENCOUNTER — OFFICE VISIT (OUTPATIENT)
Dept: PSYCHIATRY | Facility: CLINIC | Age: 29
End: 2019-08-30
Attending: NURSE PRACTITIONER
Payer: COMMERCIAL

## 2019-08-30 VITALS
BODY MASS INDEX: 32.27 KG/M2 | WEIGHT: 170.8 LBS | HEART RATE: 76 BPM | DIASTOLIC BLOOD PRESSURE: 85 MMHG | SYSTOLIC BLOOD PRESSURE: 135 MMHG

## 2019-08-30 DIAGNOSIS — F32.5 MAJOR DEPRESSION IN COMPLETE REMISSION (H): ICD-10-CM

## 2019-08-30 DIAGNOSIS — F84.0 ACTIVE AUTISTIC DISORDER: Primary | ICD-10-CM

## 2019-08-30 PROCEDURE — G0463 HOSPITAL OUTPT CLINIC VISIT: HCPCS | Mod: ZF

## 2019-08-30 RX ORDER — DEXTROAMPHETAMINE SACCHARATE, AMPHETAMINE ASPARTATE MONOHYDRATE, DEXTROAMPHETAMINE SULFATE AND AMPHETAMINE SULFATE 5; 5; 5; 5 MG/1; MG/1; MG/1; MG/1
40 CAPSULE, EXTENDED RELEASE ORAL EVERY MORNING
Qty: 60 CAPSULE | Refills: 0 | Status: SHIPPED | OUTPATIENT
Start: 2019-09-16 | End: 2019-11-22

## 2019-08-30 RX ORDER — DEXTROAMPHETAMINE SACCHARATE, AMPHETAMINE ASPARTATE MONOHYDRATE, DEXTROAMPHETAMINE SULFATE AND AMPHETAMINE SULFATE 5; 5; 5; 5 MG/1; MG/1; MG/1; MG/1
40 CAPSULE, EXTENDED RELEASE ORAL DAILY
Qty: 60 CAPSULE | Refills: 0 | Status: SHIPPED | OUTPATIENT
Start: 2019-10-16 | End: 2019-11-22

## 2019-08-30 RX ORDER — ARIPIPRAZOLE 5 MG/1
5 TABLET ORAL DAILY
Qty: 90 TABLET | Refills: 0 | Status: SHIPPED | OUTPATIENT
Start: 2019-08-30 | End: 2019-11-22 | Stop reason: SINTOL

## 2019-08-30 RX ORDER — DEXTROAMPHETAMINE SACCHARATE, AMPHETAMINE ASPARTATE MONOHYDRATE, DEXTROAMPHETAMINE SULFATE AND AMPHETAMINE SULFATE 5; 5; 5; 5 MG/1; MG/1; MG/1; MG/1
40 CAPSULE, EXTENDED RELEASE ORAL DAILY
Qty: 60 CAPSULE | Refills: 0 | Status: SHIPPED | OUTPATIENT
Start: 2019-11-16 | End: 2020-01-10

## 2019-08-30 ASSESSMENT — PAIN SCALES - GENERAL: PAINLEVEL: NO PAIN (0)

## 2019-08-30 NOTE — PATIENT INSTRUCTIONS
Thank you for coming to the PSYCHIATRY CLINIC.    Lab Testing:  If you had lab testing today and your results are reassuring or normal they will be mailed to you or sent through Transmit within 7 days.   If the lab tests need quick action we will call you with the results.  The phone number we will call with results is # [phone] (home) . If this is not the best number please call our clinic and change the number.    Medication Refills:  If you need any refills please call your pharmacy and they will contact us. Our fax number for refills is [phone]. Please allow three business for refill processing.   If you need to  your refill at a new pharmacy, please contact the new pharmacy directly. The new pharmacy will help you get your medications transferred.     Scheduling:  If you have any concerns about today's visit or wish to schedule another appointment please call our office during normal business hours [phone] (8-5:00 M-F)    Contact Us:  Please call [phone] during business hours (8-5:00 M-F).  If after clinic hours, or on the weekend, please call  [phone].    Financial Assistance [phone]  [facility] Billing [phone]  [place] Billing Office, [facility]: [phone]  [facility]illing [phone]  Medical Records [phone]      MENTAL HEALTH CRISIS NUMBERS:  [facility]:   [facility] - [phone]   Crisis Residence [facility] - [phone]   Walk-In Counseling [facility] [phone]   COPE 24/7 [place] Mobile Team for Adults - [[phone]]; Child - [[phone]]        [facility]:   [facility] - [phone]   Walk-in counseling [facility] - [phone]   Walk-in counseling [facility] - [phone]   Crisis Residence [facility] - [phone]   Urgent Care Adult Mental Health:   --Drop-in, 24/7 crisis line, and [facility] Mobile Team  [[phone]]    CRISIS TEXT LINE: Text [phone] from anywhere, anytime, any crisis 24/7;    OR SEE [email or web address]     Poison Control Center - [phone]    CHILD: [facility] needs assessment team - [phone]     [facility] - [phone]; or [facility] - [phone]    If you have a medical emergency please call 911or go to the nearest ER.                    _____________________________________________    Again thank you for choosing PSYCHIATRY CLINIC and please let us know how we can best partner with you to improve you and your family's health.  You may be receiving a survey in the mail regarding this appointment. We would love to have your feedback, both positive and negative, so please fill out the survey and return it using the provided envelope. The survey is done by an external company, so your answers are anonymous.

## 2019-08-30 NOTE — PROGRESS NOTES
"  Psychiatry Clinic Progress Note                                                                   Danyell Orr is a 28 year old female who returns to the clinic for follow-up care  Therapist: None  PCP: Raissa Garibay  Other Providers: None    Pertinent Background:  See previous notes.  Psych critical item history includes trauma hx.     Interim History                                                                                                        4, 4     The patient is a good historian, reports good treatment adherence and was last seen 6/14/19.  Since the last visit, Danyell reports she continues to do well.  Very bright affect and appropriately talkative.  No concerns with symptoms.  Continues to spend time socializing with friends.  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  Adderall continues to be effective with management of attention. No concerns with sleep, anxiety, and appetite suppression.        6/14/19: Danyell reports she is \"good!\"  Affect quite bright today and talkative.  Again reports no significant changes.  She talks with her brother frequently.  Danyell reports he sleep did improve after changing when she takes her Abilify and Prozac from bedtime to morning.      3/22/19: Danyell reports she is doing \"good.\"  No significant changes since last visit.  Her brother, Onesimo, moved to [place] and she reports she is managing change.  She does report difficulty staying asleep and is probably attributable to her taking Prozac and Abilify at bedtime.  Advised her to take in the morning.      Recent Symptoms:   Depression:  not endorsed  Elevated:  none  Psychosis:  none  Anxiety:  not endorsed  Panic Attack:  none  Trauma Related:  none     Recent Substance Use:  Alcohol- no , Tobacco- no , Caffeine- coffee/ tea [1 cup], Opioids- no    Narcan Kit- N/A , Cannabis- no  and Other Illicit Drugs-none        Social/ Family History                                  [per patient report]         "                         1ea,1ea   FINANCIAL SUPPORT- not working.  Has not worked since graduating from high school    CHILDREN- None       LIVING SITUATION- Lives with mother and stepfather in [place], [place]      LEGAL- None  EARLY HISTORY/ EDUCATION- Grew up in [place], [place].  Graduated from high school.  Received special education accommodations while in high school  SOCIAL/ SPIRITUAL SUPPORT- close friends and brother       TRAUMA HISTORY (self-report)- None  FEELS SAFE AT HOME- Yes  FAMILY HISTORY-  unknown    Medical / Surgical History                                                                                                                There is no problem list on file for this patient.      No past surgical history on file.     Medical Review of Systems                                                                                                    2,10   The remainder of the review of systems is noncontributory  Allergy                                Patient has no known allergies.  Current Medications                                                                                                       Current Outpatient Medications   Medication Sig Dispense Refill     amphetamine-dextroamphetamine (ADDERALL XR) 20 MG 24 hr capsule Take 2 capsules (40 mg) by mouth daily 60 capsule 0     amphetamine-dextroamphetamine (ADDERALL XR) 20 MG 24 hr capsule Take 2 capsules (40 mg) by mouth every morning 60 capsule 0     amphetamine-dextroamphetamine (ADDERALL XR) 20 MG 24 hr capsule Take 2 capsules (40 mg) by mouth every morning 60 capsule 0     ARIPiprazole (ABILIFY) 5 MG tablet Take 1 tablet (5 mg) by mouth daily 90 tablet 0     desogestrel-ethinyl estradiol (JULEBER) 0.15-30 MG-MCG per tablet Take 1 tablet by mouth daily 84 tablet 3     FLUoxetine (PROZAC) 20 MG capsule Take 1 capsule (20 mg) by mouth daily 90 capsule 0     Vitals                                                                                                                        3, 3   /85   Pulse 76   Wt 77.5 kg (170 lb 12.8 oz)   BMI 32.27 kg/m     Mental Status Exam                                                                                    9, 14 cog gs     Alertness: alert  and oriented  Appearance: casually groomed  Behavior/Demeanor: cooperative, pleasant and calm, with fair  eye contact   Speech: regular rate and rhythm  Language: good  Psychomotor: normal or unremarkable  Mood: description consistent with euthymia  Affect: appropriate; was congruent to mood; was congruent to content  Thought Process/Associations: unremarkable  Thought Content:  Reports none;  Denies suicidal ideation and violent ideation  Perception:  Reports none;  Denies auditory hallucinations and visual hallucinations  Insight: adequate  Judgment: good  Cognition: (6) does  appear grossly intact; formal cognitive testing was not done  Gait/Station and/or Muscle Strength/Tone: unremarkable    Labs and Data                                                                                                                 Rating Scales:    PHQ9    PHQ9 Today:  0  PHQ-9 SCORE 2/14/2019 3/22/2019 6/14/2019   PHQ-9 Total Score 0 4 1         Diagnosis and Assessment                                                                             m2, h3     Today the following issues were addressed:    1) Major Depressive Disorder, recurrent, full remission  2) Autism Spectrum Disorder (Hx)     [place] Prescription Monitoring [place] [] was [place] today:  indicates Danyell has been regularly filling Adderall XR 20mg #60 for past 12 months.       Plan                                                                                                                    m2, h3      1) Medication Management  Continue Adderall XR 40mg daily  Continue Abilify 5mg daily  Continue Prozac 20mg daily    Danyell was taking medications at bedtime.  Advised to take in morning to help with  sleep     RTC: 3 months  CRISIS NUMBERS:   Provided routinely in AVS.    Treatment Risk Statement:  The patient understands the risks, benefits, adverse effects and alternatives. Agrees to treatment with the capacity to do so. No medical contraindications to treatment. Agrees to call clinic for any problems. The patient understands to call 911 or go to the nearest ED if life threatening or urgent symptoms occur.      PROVIDER:  ZBIGNIEW Murphy CNP

## 2019-08-30 NOTE — NURSING NOTE
Chief Complaint   Patient presents with     Recheck Medication     Major depression in complete remission

## 2019-09-24 ASSESSMENT — PATIENT HEALTH QUESTIONNAIRE - PHQ9: SUM OF ALL RESPONSES TO PHQ QUESTIONS 1-9: 0

## 2019-10-18 ENCOUNTER — OFFICE VISIT (OUTPATIENT)
Dept: FAMILY MEDICINE | Facility: CLINIC | Age: 29
End: 2019-10-18
Payer: COMMERCIAL

## 2019-10-18 VITALS
SYSTOLIC BLOOD PRESSURE: 130 MMHG | BODY MASS INDEX: 32.66 KG/M2 | HEART RATE: 76 BPM | DIASTOLIC BLOOD PRESSURE: 70 MMHG | TEMPERATURE: 97 F | HEIGHT: 61 IN | WEIGHT: 173 LBS | RESPIRATION RATE: 16 BRPM

## 2019-10-18 DIAGNOSIS — Z30.41 ENCOUNTER FOR SURVEILLANCE OF CONTRACEPTIVE PILLS: ICD-10-CM

## 2019-10-18 DIAGNOSIS — F32.5 MAJOR DEPRESSION IN COMPLETE REMISSION (H): ICD-10-CM

## 2019-10-18 PROCEDURE — 99214 OFFICE O/P EST MOD 30 MIN: CPT | Performed by: NURSE PRACTITIONER

## 2019-10-18 RX ORDER — ARIPIPRAZOLE 5 MG/1
5 TABLET ORAL DAILY
Qty: 90 TABLET | Status: CANCELLED | OUTPATIENT
Start: 2019-10-18

## 2019-10-18 RX ORDER — DESOGESTREL AND ETHINYL ESTRADIOL 0.15-0.03
1 KIT ORAL DAILY
Qty: 84 TABLET | Refills: 3 | Status: SHIPPED | OUTPATIENT
Start: 2019-10-18 | End: 2021-05-11

## 2019-10-18 ASSESSMENT — MIFFLIN-ST. JEOR: SCORE: 1452.1

## 2019-10-18 NOTE — PROGRESS NOTES
"Subjective     Danyell Orr is a 29 year old female who presents to clinic today for the following health issues:    Contraception Management - yearly check up for birth control.         There is no problem list on file for this patient.    History reviewed. No pertinent surgical history.    Social History     Tobacco Use     Smoking status: Never Smoker     Smokeless tobacco: Never Used   Substance Use Topics     Alcohol use: No     Family History   Problem Relation Age of Onset     Heart Disease Mother      Myocardial Infarction Father          Current Outpatient Medications   Medication Sig Dispense Refill     amphetamine-dextroamphetamine (ADDERALL XR) 20 MG 24 hr capsule Take 2 capsules (40 mg) by mouth every morning 60 capsule 0     amphetamine-dextroamphetamine (ADDERALL XR) 20 MG 24 hr capsule Take 2 capsules (40 mg) by mouth daily 60 capsule 0     [START ON 11/16/2019] amphetamine-dextroamphetamine (ADDERALL XR) 20 MG 24 hr capsule Take 2 capsules (40 mg) by mouth daily 60 capsule 0     ARIPiprazole (ABILIFY) 5 MG tablet Take 1 tablet (5 mg) by mouth daily 90 tablet 0     desogestrel-ethinyl estradiol (JULEBER) 0.15-30 MG-MCG tablet Take 1 tablet by mouth daily 84 tablet 3     FLUoxetine (PROZAC) 20 MG capsule Take 1 capsule (20 mg) by mouth daily 90 capsule 0     No Known Allergies  Recent Labs   Lab Test 12/07/18  0944   A1C 5.4      BP Readings from Last 3 Encounters:   10/18/19 130/70   08/30/19 135/85   06/14/19 126/85    Wt Readings from Last 3 Encounters:   10/18/19 78.5 kg (173 lb)   08/30/19 77.5 kg (170 lb 12.8 oz)   06/14/19 76 kg (167 lb 9.6 oz)                    Reviewed and updated as needed this visit by Provider         Review of Systems   ROS COMP: Constitutional, HEENT, cardiovascular, pulmonary, gi and gu systems are negative, except as otherwise noted.      Objective    /70   Pulse 76   Temp 97  F (36.1  C) (Tympanic)   Resp 16   Ht 1.549 m (5' 1\")   Wt 78.5 kg (173 lb)   " "LMP 09/29/2019   BMI 32.69 kg/m    Body mass index is 32.69 kg/m .  Physical Exam   GENERAL: healthy, alert and no distress  EYES: Eyes grossly normal to inspection, PERRL and conjunctivae and sclerae normal  HENT: ear canals and TM's normal, nose and mouth without ulcers or lesions  NECK: no adenopathy, no asymmetry, masses, or scars and thyroid normal to palpation  RESP: lungs clear to auscultation - no rales, rhonchi or wheezes  BREAST: normal without masses, tenderness or nipple discharge and no palpable axillary masses or adenopathy  CV: regular rate and rhythm, normal S1 S2, no S3 or S4, no murmur, click or rub, no peripheral edema and peripheral pulses strong  ABDOMEN: soft, nontender, no hepatosplenomegaly, no masses and bowel sounds normal  MS: no gross musculoskeletal defects noted, no edema  SKIN: no suspicious lesions or rashes  NEURO: Normal strength and tone, mentation intact and speech normal  PSYCH: mentation appears normal, affect normal/bright            Assessment & Plan     (F32.5) Major depression in complete remission (H)  Comment: Controlled  Plan: Patient will follow-up when she needs refill of her Adderall    (Z30.41) Encounter for surveillance of contraceptive pills  Comment: Birth control pills refilled today  Plan: desogestrel-ethinyl estradiol (JULEBER) 0.15-30        MG-MCG tablet         BMI:   Estimated body mass index is 32.69 kg/m  as calculated from the following:    Height as of this encounter: 1.549 m (5' 1\").    Weight as of this encounter: 78.5 kg (173 lb).   Weight management plan: Discussed healthy diet and exercise guidelines        See Patient Instructions    Return in about 1 year (around 10/18/2020) for Physical Exam.    ZBIGNIEW Yañez [facility]      "

## 2019-10-18 NOTE — PATIENT INSTRUCTIONS
Patient Education     Birth Control: The Pill    Birth control pills contain hormones that help prevent pregnancy. The pills are prescribed by your healthcare provider. There are many types of birth control pills available. If you have side effects from one type of pill, tell your healthcare provider. He or she may be able to prescribe a pill that works better for you.  Pregnancy rates  Talk to your healthcare provider about the effectiveness of this birth control method.  Using the pill    Take one pill daily. Take it at around the same time each day.    Follow your healthcare provider s guidelines on when to start your first pack of pills. You may need to use another form of birth control for a week or more after you start.    Know what to do if you forget to take a pill. (Consult your healthcare provider or check the package.) If you miss more than one pill, you may need to use a backup method of birth control for a week or more.  Pros    Low pregnancy rate    No interruption to sex    Easy to use    Can help make periods more regular    May lower your risk of ovarian cysts and certain cancers    May decrease menstrual cramps, menstrual flow, and acne  Cons    Does not protect against sexually transmitted infection (STIs)    Requires taking a pill on time each day    May not work as well when taken with certain other medicines (check with your pharmacist)    May cause side effects such as nausea, irregular bleeding, headaches, breast tenderness, fatigue, or mood changes (these often go away within 3 months)    May increase the risk of blood clots, heart attack, and stroke  The pill may not be for you  The pill may not be for you if:    You are a smoker and over age 35    You have high blood pressure or gallbladder, liver, cerebrovascular  or heart disease    You have diabetes, migraines, blood clot in the vein or artery, lupus, depression, certain lipid disorders, or take medicines that interfere with the  pill  In these cases, discuss the risks with your healthcare provider.  Date Last Reviewed: 3/1/2017    0871-7816 [employer] [address] [facility], [place] [address]. All rights reserved. This information is not intended as a substitute for professional medical care. Always follow your healthcare professional's instructions.

## 2019-10-22 ENCOUNTER — TELEPHONE (OUTPATIENT)
Dept: PSYCHIATRY | Facility: CLINIC | Age: 29
End: 2019-10-22

## 2019-10-22 NOTE — TELEPHONE ENCOUNTER
Viktor Ordaz, APRN Amanda Swenson, RN   Caller: Unspecified (Today, 12:07 PM)             Hi Amanda,     Her scheduled time (10/24) should be ok     Thanks          Writer called pt and her mother and relayed the above recommendation. Pt and her mother voiced understanding. Recommended urgent care or ED if things worsen prior to 10/24 appt.

## 2019-10-22 NOTE — TELEPHONE ENCOUNTER
Writer received incoming phone call from pt's mother, Debra. She is calling in regards to possible SEs the pt may be having from her medications.    Per Debra, the pt is now taking the generic formulations of her meds. Most recent change was the Adderall. Just before the last appt, mom noticed the pt was blinking rapidly. Within the last week, the pt has been complaining of it more. This causes issues with balance and completing enjoyable activities, such as reading. Mom has also noticed that the blinking increases with anxiety, stress, and at the end of the day.    Along with the blinking, the pt has also been complaining of a sore neck and jaw this last week. Mom has also noticed the pt will stick out her tongue often, but tries not to bring it to the pt's attention, as this is embarrassing for the pt.     Mom confirmed that the pt is taking Abilify 5 mg daily, along with her other meds prescribed the provider. She denies any additional concerns.     Pt is now scheduled with the provider on Thursday, 10/24, but writer will connect with provider to see if she should be assessed sooner.

## 2019-10-24 ENCOUNTER — OFFICE VISIT (OUTPATIENT)
Dept: PSYCHIATRY | Facility: CLINIC | Age: 29
End: 2019-10-24
Attending: NURSE PRACTITIONER
Payer: COMMERCIAL

## 2019-10-24 VITALS
BODY MASS INDEX: 32.31 KG/M2 | SYSTOLIC BLOOD PRESSURE: 128 MMHG | WEIGHT: 171 LBS | HEART RATE: 85 BPM | DIASTOLIC BLOOD PRESSURE: 82 MMHG

## 2019-10-24 DIAGNOSIS — F84.0 ACTIVE AUTISTIC DISORDER: Primary | ICD-10-CM

## 2019-10-24 PROCEDURE — G0463 HOSPITAL OUTPT CLINIC VISIT: HCPCS | Mod: ZF

## 2019-10-24 ASSESSMENT — PAIN SCALES - GENERAL: PAINLEVEL: NO PAIN (0)

## 2019-10-24 NOTE — PROGRESS NOTES
"  Psychiatry Clinic Progress Note                                                                   Danyell Orr is a 29 year old female who returns to the clinic for follow-up care  Therapist: None  PCP: Raissa Garibay  Other Providers: None    Pertinent Background:  See previous notes.  Psych critical item history includes trauma hx.     Interim History                                                                                                        4, 4     The patient is a good historian, reports good treatment adherence and was last seen 8/30/19.  Since the last visit, Danyell comes to the clinic quite distressed.  She reports uncontrollable eye blinking which is observed.  AIMS completed and only significant for blinking and awareness.  No other movements noticed.  However, should be mentioned that movements ceased when Danyell was distracted.  Agreed to taper and discontinue Abilify.  Will call to check in in two weeks.        8/30/19: Danyell reports she continues to do well.  Very bright affect and appropriately talkative.  No concerns with symptoms.  Continues to spend time socializing with friends.  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  Adderall continues to be effective with management of attention. No concerns with sleep, anxiety, and appetite suppression.        6/14/19: Danyell reports she is \"good!\"  Affect quite bright today and talkative.  Again reports no significant changes.  She talks with her brother frequently.  Danyell reports he sleep did improve after changing when she takes her Abilify and Prozac from bedtime to morning.      3/22/19: Danyell reports she is doing \"good.\"  No significant changes since last visit.  Her brother, Onesimo, moved to [place] and she reports she is managing change.  She does report difficulty staying asleep and is probably attributable to her taking Prozac and Abilify at bedtime.  Advised her to take in the morning.      Recent Symptoms:   Depression:  " depressed mood and feeling hopeless  Elevated:  none  Psychosis:  none  Anxiety:  excessive worry, feeling fearful and nervous/overwhelmed  Panic Attack:  none  Trauma Related:  none     Recent Substance Use:  Alcohol- no , Tobacco- no , Caffeine- coffee/ tea [1 cup], Opioids- no    Narcan Kit- N/A , Cannabis- no  and Other Illicit Drugs-none        Social/ Family History                                  [per patient report]                                 1ea,1ea   FINANCIAL SUPPORT- not working.  Has not worked since graduating from high school    CHILDREN- None       LIVING SITUATION- Lives with mother and stepfather in [place], [place]      LEGAL- None  EARLY HISTORY/ EDUCATION- Grew up in [place], [place].  Graduated from high school.  Received special education accommodations while in high school  SOCIAL/ SPIRITUAL SUPPORT- close friends and brother       TRAUMA HISTORY (self-report)- None  FEELS SAFE AT HOME- Yes  FAMILY HISTORY-  unknown    Medical / Surgical History                                                                                                                There is no problem list on file for this patient.      No past surgical history on file.     Medical Review of Systems                                                                                                    2,10   The remainder of the review of systems is noncontributory  Allergy                                Patient has no known allergies.  Current Medications                                                                                                       Current Outpatient Medications   Medication Sig Dispense Refill     amphetamine-dextroamphetamine (ADDERALL XR) 20 MG 24 hr capsule Take 2 capsules (40 mg) by mouth every morning 60 capsule 0     amphetamine-dextroamphetamine (ADDERALL XR) 20 MG 24 hr capsule Take 2 capsules (40 mg) by mouth daily 60 capsule 0     [START ON 11/16/2019] amphetamine-dextroamphetamine (ADDERALL XR)  20 MG 24 hr capsule Take 2 capsules (40 mg) by mouth daily 60 capsule 0     ARIPiprazole (ABILIFY) 5 MG tablet Take 1 tablet (5 mg) by mouth daily 90 tablet 0     desogestrel-ethinyl estradiol (JULEBER) 0.15-30 MG-MCG tablet Take 1 tablet by mouth daily 84 tablet 3     FLUoxetine (PROZAC) 20 MG capsule Take 1 capsule (20 mg) by mouth daily 90 capsule 0     Vitals                                                                                                                       3, 3   /82   Pulse 85   Wt 77.6 kg (171 lb)   LMP 09/29/2019   BMI 32.31 kg/m     Mental Status Exam                                                                                    9, 14 cog gs     Alertness: alert  and oriented  Appearance: casually groomed  Behavior/Demeanor: cooperative and pleasant, with fair  eye contact   Speech: regular rate and rhythm  Language: good  Psychomotor: normal or unremarkable  Mood: depressed, anxious and worried  Affect: appropriate; was congruent to mood; was congruent to content  Thought Process/Associations: unremarkable  Thought Content:  Reports none;  Denies suicidal ideation and violent ideation  Perception:  Reports none;  Denies auditory hallucinations and visual hallucinations  Insight: adequate  Judgment: good  Cognition: (6) does  appear grossly intact; formal cognitive testing was not done  Gait/Station and/or Muscle Strength/Tone: unremarkable    Labs and Data                                                                                                                 Rating Scales:    PHQ9    PHQ9 Today:  Not completed  PHQ-9 SCORE 3/22/2019 6/14/2019 8/30/2019   PHQ-9 Total Score 4 1 0         Diagnosis and Assessment                                                                             m2, h3     Today the following issues were addressed:    1) Major Depressive Disorder, recurrent, full remission  2) Autism Spectrum Disorder (Hx)     [place] Prescription Monitoring Program  [] was checked today:  indicates Danyell has been regularly filling Adderall XR 20mg #60 for past 12 months.       Plan                                                                                                                    m2, h3      1) Medication Management  Continue Adderall XR 40mg daily  Decrease Abilify to 2.5 daily for a week then discontinue  Continue Prozac 20mg daily    2) Referral   Neurology      RTC: 1 month  CRISIS NUMBERS:   Provided routinely in AVS.    Treatment Risk Statement:  The patient understands the risks, benefits, adverse effects and alternatives. Agrees to treatment with the capacity to do so. No medical contraindications to treatment. Agrees to call clinic for any problems. The patient understands to call 911 or go to the nearest ED if life threatening or urgent symptoms occur.      PROVIDER:  ZBIGNIEW Murphy CNP

## 2019-11-03 ENCOUNTER — PRE VISIT (OUTPATIENT)
Dept: NEUROLOGY | Facility: CLINIC | Age: 29
End: 2019-11-03

## 2019-11-03 NOTE — TELEPHONE ENCOUNTER
FUTURE VISIT INFORMATION      FUTURE VISIT INFORMATION:    Date: 11/29/2019    Time: 1030AM    Location: [facility]  REFERRAL INFORMATION:    Referring provider:  Viktor Monroe    Referring providers clinic:  [facility]     Reason for visit/diagnosis  Possible Parkinson's Disease     RECORDS REQUESTED FROM:       Clinic name Comments Records Status [facility]   [facility]    [facility]  Care Everywhere N/A

## 2019-11-14 ENCOUNTER — TELEPHONE (OUTPATIENT)
Dept: PSYCHIATRY | Facility: CLINIC | Age: 29
End: 2019-11-14

## 2019-11-14 NOTE — TELEPHONE ENCOUNTER
Called Danyell to check in about possible EPSE.  Recommended taper/discontinuation of Abilify at last appointment on 10/24/19.  Today she reports she no longer experiences excessive blinking or other EPSE symptoms.  Also does not report any concern with worsening of symptoms.

## 2019-11-22 ENCOUNTER — OFFICE VISIT (OUTPATIENT)
Dept: PSYCHIATRY | Facility: CLINIC | Age: 29
End: 2019-11-22
Attending: NURSE PRACTITIONER
Payer: COMMERCIAL

## 2019-11-22 DIAGNOSIS — F84.0 ACTIVE AUTISTIC DISORDER: ICD-10-CM

## 2019-11-22 DIAGNOSIS — F32.5 MAJOR DEPRESSION IN COMPLETE REMISSION (H): ICD-10-CM

## 2019-11-22 RX ORDER — DEXTROAMPHETAMINE SACCHARATE, AMPHETAMINE ASPARTATE MONOHYDRATE, DEXTROAMPHETAMINE SULFATE AND AMPHETAMINE SULFATE 5; 5; 5; 5 MG/1; MG/1; MG/1; MG/1
40 CAPSULE, EXTENDED RELEASE ORAL DAILY
Qty: 60 CAPSULE | Refills: 0 | Status: SHIPPED | OUTPATIENT
Start: 2020-01-15 | End: 2020-01-16

## 2019-11-22 RX ORDER — DEXTROAMPHETAMINE SACCHARATE, AMPHETAMINE ASPARTATE MONOHYDRATE, DEXTROAMPHETAMINE SULFATE AND AMPHETAMINE SULFATE 5; 5; 5; 5 MG/1; MG/1; MG/1; MG/1
40 CAPSULE, EXTENDED RELEASE ORAL DAILY
Qty: 60 CAPSULE | Refills: 0 | Status: SHIPPED | OUTPATIENT
Start: 2019-12-16 | End: 2020-01-10

## 2019-11-22 NOTE — PROGRESS NOTES
"  Psychiatry Clinic Progress Note                                                                   Danyell Orr is a 29 year old female who returns to the clinic for follow-up care  Therapist: None  PCP: Raissa Garibay  Other Providers: None    Pertinent Background:  See previous notes.  Psych critical item history includes trauma hx.     Interim History                                                                                                        4, 4     The patient is a good historian, reports good treatment adherence and was last seen 8/30/19.  Since the last visit, Danyell reports blinking has stopped after Abilify was discontinued.  No blinking observed during appointment.  She is unsure how many days after discontinuation the symptoms subsided.  Danyell has not reported any worsening of mental health symptoms.  Affect bright and appropriately talkative.  Sleep \"ok\" but not a concern.  Adderall continues to be helpful.      8/30/19: Danyell reports she continues to do well.  Very bright affect and appropriately talkative.  No concerns with symptoms.  Continues to spend time socializing with friends.  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  Adderall continues to be effective with management of attention. No concerns with sleep, anxiety, and appetite suppression.        6/14/19: Danyell reports she is \"good!\"  Affect quite bright today and talkative.  Again reports no significant changes.  She talks with her brother frequently.  Danyell reports he sleep did improve after changing when she takes her Abilify and Prozac from bedtime to morning.      3/22/19: Danyell reports she is doing \"good.\"  No significant changes since last visit.  Her brother, Onesimo, moved to [place] and she reports she is managing change.  She does report difficulty staying asleep and is probably attributable to her taking Prozac and Abilify at bedtime.  Advised her to take in the morning.      Recent Symptoms:   Depression:  " not endorsed  Elevated:  none  Psychosis:  none  Anxiety:  not endorsed  Panic Attack:  none  Trauma Related:  none     Recent Substance Use:  Alcohol- no , Tobacco- no , Caffeine- coffee/ tea [1 cup], Opioids- no    Narcan Kit- N/A , Cannabis- no  and Other Illicit Drugs-none        Social/ Family History                                  [per patient report]                                 1ea,1ea   FINANCIAL SUPPORT- not working.  Has not worked since graduating from high school    CHILDREN- None       LIVING SITUATION- Lives with mother and stepfather in [place], [place]      LEGAL- None  EARLY HISTORY/ EDUCATION- Grew up in [place], [place].  Graduated from high school.  Received special education accommodations while in high school  SOCIAL/ SPIRITUAL SUPPORT- close friends and brother       TRAUMA HISTORY (self-report)- None  FEELS SAFE AT HOME- Yes  FAMILY HISTORY-  unknown    Medical / Surgical History                                                                                                                There is no problem list on file for this patient.      No past surgical history on file.     Medical Review of Systems                                                                                                    2,10   The remainder of the review of systems is noncontributory  Allergy                                Patient has no known allergies.  Current Medications                                                                                                       Current Outpatient Medications   Medication Sig Dispense Refill     amphetamine-dextroamphetamine (ADDERALL XR) 20 MG 24 hr capsule Take 2 capsules (40 mg) by mouth every morning 60 capsule 0     amphetamine-dextroamphetamine (ADDERALL XR) 20 MG 24 hr capsule Take 2 capsules (40 mg) by mouth daily 60 capsule 0     amphetamine-dextroamphetamine (ADDERALL XR) 20 MG 24 hr capsule Take 2 capsules (40 mg) by mouth daily 60 capsule 0     ARIPiprazole  (ABILIFY) 5 MG tablet Take 1 tablet (5 mg) by mouth daily 90 tablet 0     desogestrel-ethinyl estradiol (JULEBER) 0.15-30 MG-MCG tablet Take 1 tablet by mouth daily 84 tablet 3     FLUoxetine (PROZAC) 20 MG capsule Take 1 capsule (20 mg) by mouth daily 90 capsule 0     Vitals                                                                                                                       3, 3   There were no vitals taken for this visit.   Mental Status Exam                                                                                    9, 14 cog gs     Alertness: alert  and oriented  Appearance: casually groomed  Behavior/Demeanor: cooperative, pleasant and calm, with fair  eye contact   Speech: normal  Language: no problems  Psychomotor: normal or unremarkable  Mood: description consistent with euthymia  Affect: appropriate; was congruent to mood; was congruent to content  Thought Process/Associations: unremarkable  Thought Content:  Reports none;  Denies suicidal ideation and violent ideation  Perception:  Reports none;  Denies auditory hallucinations and visual hallucinations  Insight: adequate  Judgment: good  Cognition: (6) does  appear grossly intact; formal cognitive testing was not done  Gait/Station and/or Muscle Strength/Tone: unremarkable    Labs and Data                                                                                                                 Rating Scales:    PHQ9    PHQ9 Today:  0  PHQ-9 SCORE 3/22/2019 6/14/2019 8/30/2019   PHQ-9 Total Score 4 1 0         Diagnosis and Assessment                                                                             m2, h3     Today the following issues were addressed:    1) Major Depressive Disorder, recurrent, full remission  2) Autism Spectrum Disorder (Hx)     [place] Prescription Monitoring Program [] was checked today:  indicates Danyell has been regularly filling Adderall XR 20mg #60 for past 12 months.       Plan                                                                                                                     m2, h3      1) Medication Management  Continue Adderall XR 40mg daily  Continue Prozac 20mg daily    2) Referral   Neurology if EPSE symptoms re-emerge     RTC: 3 months  CRISIS NUMBERS:   Provided routinely in AVS.    Treatment Risk Statement:  The patient understands the risks, benefits, adverse effects and alternatives. Agrees to treatment with the capacity to do so. No medical contraindications to treatment. Agrees to call clinic for any problems. The patient understands to call 911 or go to the nearest ED if life threatening or urgent symptoms occur.      PROVIDER:  ZBIGNIEW Murphy CNP

## 2019-12-17 PROBLEM — G24.01 TARDIVE DYSKINESIA: Status: ACTIVE | Noted: 2019-12-17

## 2019-12-17 RX ORDER — HYDROCODONE BITARTRATE AND ACETAMINOPHEN 5; 325 MG/1; MG/1
1 TABLET ORAL EVERY 4 HOURS PRN
COMMUNITY
Start: 2018-07-03 | End: 2020-01-10

## 2019-12-17 RX ORDER — IBUPROFEN 600 MG/1
600 TABLET, FILM COATED ORAL EVERY 6 HOURS PRN
COMMUNITY
Start: 2018-07-03

## 2019-12-17 RX ORDER — ARIPIPRAZOLE 5 MG/1
5 TABLET ORAL DAILY
COMMUNITY
Start: 2009-09-08 | End: 2020-01-10

## 2019-12-17 RX ORDER — DEXTROAMPHETAMINE SACCHARATE, AMPHETAMINE ASPARTATE, DEXTROAMPHETAMINE SULFATE AND AMPHETAMINE SULFATE 5; 5; 5; 5 MG/1; MG/1; MG/1; MG/1
TABLET ORAL
COMMUNITY
Start: 2009-09-08 | End: 2020-01-10

## 2019-12-17 RX ORDER — AMOXICILLIN 500 MG/1
500 CAPSULE ORAL 2 TIMES DAILY
COMMUNITY
Start: 2018-07-03 | End: 2020-01-10

## 2019-12-20 NOTE — PROGRESS NOTES
"Summary and Recommendations:     \"Autism spectrum disorder\" -     Major depression disorder, full remission    Involuntary movements -  - abilify was weaned off over one week.     Family history of heart disease - father had a cardiac arrest which she witnessed and he  at 48 years  Mother had a cardiac arrest that was resuscitated and was 2 years and she witnessed this as well    Patient presently living with her mother and step father and receiving psychiatric care with Viktor Ordaz, [facility], [facility] / [facility]  Will be seeing Sushma next Thursday    Her PCP is Raissa Garibay     PLAN  Baseline neuropsychological evaluation for Autism spectrum disorder - she has not had testing for quite a while - since it was done as a child.   Consider baseline ECG -   Pharmacy consultation but would see if the involuntary blinking  - if covered. For now will wait on the use of dopamine depletor medications for TD  She will be seeing Sushma next Thursday  Return Neurology depending on whether things are getting better and if seen by pharmacist  She granted proxy access to her step father Julio Cesar who has email and set him up for [employer].   Her mother came in for the appointment as well as the step father and cousin's son            Raghu Spence MD  _____________________________________________________________________  PATIENT: Danyell Orr  29 year old female   : 1990  SCOTTY: January 10, 2020    Consult requested by pcp/other        Outside records reviewed and revealed  - inserted.       History obtained from patient      History of Present Illness  29 year old yo with movement disorder    Not working  She takes care of disabled mother  She had a heart attack - cardiac arrest 2 years ago  Unemployed.     She is not wearing glasses  Psychiatrist   Jermaine Jerry   psychiatrist  No counselor presently    Has some constipation  Bladder issues - has urgency and frequency  Drinks a lot of water  Hearing is " okay  Psoriasis and eczema  Lungs are fine  Heart disease in the family  She does not have problems  Neuro - no headaches  Endo - denies  Heme - denies  Id - denies  Allergies - denies  Sleep - wakes up after falling asleep - wakes up 2 - 3 times per night  She has been on psychiatric medications since age 11 yrs.     Mother is in the waiting room.   Julio Cesar elaine father is in the waiting room  Mother has had cognitive problems since her cardiac arrest    She is unable to recall her medication history    She had developed problems with blinking and keeping her eyes open  It has been there for 3 months and it is better  She thinks that the antipsychotic was the culprit and th is medication was stopped two months ago.     She had been on abilify  For unclear duration  Waylon Fields - was her psychiatrist at [facility] seen by psychiatry 2/14/2019  Danyell has a history of ASD and adjustment disorder.         Medications                    Amphetamine-dextroamphetamine adderall XR 2mg 24 hr capsule        Aripiprazole abilify 5mg         Desogestrel-ethinly estradiol juleber 0.15-30mg-mcg tablet        Fluoxetine prozac 20mg         Hydrocodone-acetaminophen norco 5-325mg         Ibuprofen advil/motrin 600mg         Psyllium                                                                                                                                            Answers for HPI/ROS submitted by the patient on 1/10/2020   General Symptoms: No  Skin Symptoms: No  HENT Symptoms: No  EYE SYMPTOMS: Yes  HEART SYMPTOMS: No  LUNG SYMPTOMS: No  INTESTINAL SYMPTOMS: Yes  URINARY SYMPTOMS: Yes  GYNECOLOGIC SYMPTOMS: No  BREAST SYMPTOMS: No  SKELETAL SYMPTOMS: Yes  BLOOD SYMPTOMS: No  NERVOUS SYSTEM SYMPTOMS: No  MENTAL HEALTH SYMPTOMS: No  Eye pain: Yes  Vision loss: No  Dry eyes: Yes  Watery eyes: No  Eye bulging: No  Double vision: No  Flashing of lights: No  Spots: Yes  Floaters: No  Redness: No  Crossed eyes: No  Tunnel  Vision: No  Yellowing of eyes: No  Eye irritation: Yes  Heart burn or indigestion: No  Nausea: No  Vomiting: No  Abdominal pain: Yes  Bloating: Yes  Constipation: Yes  Diarrhea: Yes  Blood in stool: No  Black stools: No  Rectal or Anal pain: No  Fecal incontinence: No  Yellowing of skin or eyes: No  Vomit with blood: No  Change in stools: No  Trouble holding urine or incontinence: Yes  Pain or burning: No  Trouble starting or stopping: No  Increased frequency of urination: Yes  Blood in urine: No  Decreased frequency of urination: No  Frequent nighttime urination: No  Flank pain: No  Difficulty emptying bladder: No  Back pain: Yes  Muscle aches: No  Neck pain: No  Swollen joints: No  Joint pain: Yes  Bone pain: No  Muscle cramps: No  Muscle weakness: No  Joint stiffness: Yes  Bone fracture: No    14 Review of systems  are negative except for   Patient Active Problem List   Diagnosis     Anxiety     Tardive dyskinesia      No Known Allergies  No past surgical history on file.  Past Medical History:   Diagnosis Date     Tardive dyskinesia 12/17/2019     Social History     Socioeconomic History     Marital status: Single     Spouse name: Not on file     Number of children: Not on file     Years of education: Not on file     Highest education level: Not on file   Occupational History     Not on file   Social Needs     Financial resource strain: Not on file     Food insecurity:     Worry: Not on file     Inability: Not on file     Transportation needs:     Medical: Not on file     Non-medical: Not on file   Tobacco Use     Smoking status: Never Smoker     Smokeless tobacco: Never Used   Substance and Sexual Activity     Alcohol use: No     Drug use: No     Sexual activity: Not Currently   Lifestyle     Physical activity:     Days per week: Not on file     Minutes per session: Not on file     Stress: Not on file   Relationships     Social connections:     Talks on phone: Not on file     Gets together: Not on file      Attends Jew service: Not on file     Active member of club or organization: Not on file     Attends meetings of clubs or organizations: Not on file     Relationship status: Not on file     Intimate partner violence:     Fear of current or ex partner: Not on file     Emotionally abused: Not on file     Physically abused: Not on file     Forced sexual activity: Not on file   Other Topics Concern     Parent/sibling w/ CABG, MI or angioplasty before 65F 55M? Not Asked   Social History Narrative    single. lives in [facility]. Debra parisi mother     Family History   Problem Relation Age of Onset     Heart Disease Mother      Myocardial Infarction Father      Current Outpatient Medications   Medication Sig Dispense Refill     amoxicillin (AMOXIL) 500 MG capsule Take 500 mg by mouth       amphetamine-dextroamphetamine (ADDERALL) 20 MG tablet take 2 tablet (20 mg) by oral route once daily before breakfast       ARIPiprazole (ABILIFY) 5 MG tablet Take 5 mg by mouth       HYDROcodone-acetaminophen (NORCO) 5-325 MG tablet Take 1 tablet by mouth       ibuprofen (ADVIL/MOTRIN) 600 MG tablet Take 600 mg by mouth       PSYLLIUM PO Take 1 tsp. by mouth       amphetamine-dextroamphetamine (ADDERALL XR) 20 MG 24 hr capsule Take 2 capsules (40 mg) by mouth daily 60 capsule 0     [START ON 1/15/2020] amphetamine-dextroamphetamine (ADDERALL XR) 20 MG 24 hr capsule Take 2 capsules (40 mg) by mouth daily 60 capsule 0     amphetamine-dextroamphetamine (ADDERALL XR) 20 MG 24 hr capsule Take 2 capsules (40 mg) by mouth daily 60 capsule 0     desogestrel-ethinyl estradiol (JULEBER) 0.15-30 MG-MCG tablet Take 1 tablet by mouth daily 84 tablet 3     FLUoxetine (PROZAC) 20 MG capsule Take 1 capsule (20 mg) by mouth daily 90 capsule 0     Examination  B/P: Data Unavailable, T: Data Unavailable, P: Data Unavailable, R: Data Unavailable 0 lbs 0 oz  There were no vitals taken for this visit., There is no height or weight on file to  calculate BMI.    Vitals signs were added and reviewed if not above. Please refer to the chart from this visit.    General examination: well developed, nourished and normal affect  Carotid: No bruits. Chest CTA, Heart regular without gallops or murmurs. Abdomen soft nontender, no masses, bowel sounds intact. Periphery: normal pulses without edema. No skin lesions. MENTAL STATUS:  Alert, oriented x3.  Speech fluent with normal naming, repetition, comprehension.  Good right-left orientation, Can remember 2/3 objects.  5/5 on spelling world backwards. Needed two trials CRANIAL NERVES:  Disks flat. Pupils are equal, round, reactive to light.  Normal vascularity and fields. Extraocular movements full.  Facial sensation and movement normal.  Hearing intact. Palate moves symmetrically.  Tongue midline.  Sternocleidomastoid and trapezius strength intact.  Neck strength was normal.  NEUROLOGIC:  Tone: normal. Motor in upper and lower extremities. 5/5.  Reflexes 2/4.  Toe signs downgoing.  Good finger-nose-finger, fine finger movement, heel-shin maneuver, sensation to light touch, position sense and vibration and temperature was normal. Gait normal. Romberg and postural stability intact. Tremor normal

## 2020-01-10 ENCOUNTER — OFFICE VISIT (OUTPATIENT)
Dept: NEUROLOGY | Facility: CLINIC | Age: 30
End: 2020-01-10
Payer: COMMERCIAL

## 2020-01-10 VITALS
WEIGHT: 162.7 LBS | BODY MASS INDEX: 30.74 KG/M2 | HEART RATE: 100 BPM | DIASTOLIC BLOOD PRESSURE: 88 MMHG | SYSTOLIC BLOOD PRESSURE: 129 MMHG | OXYGEN SATURATION: 96 %

## 2020-01-10 DIAGNOSIS — F84.0 AUTISM SPECTRUM DISORDER: Primary | ICD-10-CM

## 2020-01-10 DIAGNOSIS — F32.0 CURRENT MILD EPISODE OF MAJOR DEPRESSIVE DISORDER WITHOUT PRIOR EPISODE (H): ICD-10-CM

## 2020-01-10 DIAGNOSIS — L40.9 PSORIASIS: ICD-10-CM

## 2020-01-10 DIAGNOSIS — G24.01 TARDIVE DYSKINESIA: ICD-10-CM

## 2020-01-10 DIAGNOSIS — L30.9 ECZEMA, UNSPECIFIED TYPE: ICD-10-CM

## 2020-01-10 DIAGNOSIS — G47.00 INSOMNIA, UNSPECIFIED TYPE: ICD-10-CM

## 2020-01-10 PROBLEM — F32.9 MAJOR DEPRESSIVE DISORDER: Status: ACTIVE | Noted: 2020-01-10

## 2020-01-10 ASSESSMENT — ENCOUNTER SYMPTOMS
EYE PAIN: 1
DYSURIA: 0
BLOATING: 1
EYE WATERING: 0
MUSCLE CRAMPS: 0
FLANK PAIN: 0
NECK PAIN: 0
HEARTBURN: 0
CONSTIPATION: 1
EYE REDNESS: 0
MUSCLE WEAKNESS: 0
BOWEL INCONTINENCE: 0
JAUNDICE: 0
EYE IRRITATION: 1
RECTAL PAIN: 0
DIFFICULTY URINATING: 0
HEMATURIA: 0
BACK PAIN: 1
DOUBLE VISION: 0
NAUSEA: 0
ABDOMINAL PAIN: 1
ARTHRALGIAS: 1
DIARRHEA: 1
JOINT SWELLING: 0
MYALGIAS: 0
BLOOD IN STOOL: 0
VOMITING: 0
STIFFNESS: 1

## 2020-01-10 ASSESSMENT — PAIN SCALES - GENERAL: PAINLEVEL: NO PAIN (0)

## 2020-01-10 NOTE — PATIENT INSTRUCTIONS
"  \"Autism spectrum disorder\" -     Major depression disorder, full remission    Involuntary movements -  - abilify was weaned off over one week.     Family history of heart disease - father had a cardiac arrest which she witnessed and he  at 48 years  Mother had a cardiac arrest that was resuscitated and was 2 years and she witnessed this as well    Patient presently living with her mother and step father and receiving psychiatric care with Viktor Ordaz, [facility], [facility]  Will be seeing Sushma next Thursday    Her PCP is Raissa Garibay     PLAN  Baseline neuropsychological evaluation for Autism spectrum disorder - she has not had testing for quite a while - since it was done as a child.   Consider baseline ECG -   Pharmacy consultation but would see if the involuntary blinking  - if covered. For now will wait on the use of dopamine depletor medications for TD  She will be seeing Sushma next Thursday  Return Neurology depending on whether things are getting better and if seen by pharmacist  She granted proxy access to her step father Julio Cesar who has email and set him up for [employer].   Her mother came in for the appointment as well as the step father and cousin's son      "

## 2020-01-10 NOTE — LETTER
"1/10/2020       RE: Danyell Orr  [address]  [place] [address]     Dear Colleague,    Thank you for referring your patient, Danyell Orr, to the [facility] NEUROLOGY at [facility]. Please see a copy of my visit note below.    Summary and Recommendations:     \"Autism spectrum disorder\" -     Major depression disorder, full remission    Involuntary movements -  - abilify was weaned off over one week.     Family history of heart disease - father had a cardiac arrest which she witnessed and he  at 48 years  Mother had a cardiac arrest that was resuscitated and was 2 years and she witnessed this as well    Patient presently living with her mother and step father and receiving psychiatric care with Viktor Ordaz, [facility], [facility]  Will be seeing Sushma next Thursday    Her PCP is Raissa Garibay     PLAN  Baseline neuropsychological evaluation for Autism spectrum disorder - she has not had testing for quite a while - since it was done as a child.   Consider baseline ECG -   Pharmacy consultation but would see if the involuntary blinking  - if covered. For now will wait on the use of dopamine depletor medications for TD  She will be seeing Sushma next Thursday  Return Neurology depending on whether things are getting better and if seen by pharmacist  She granted proxy access to her step father Julio Cesar who has email and set him up for [employer].   Her mother came in for the appointment as well as the step father and cousin's son            Raghu Spence MD  _____________________________________________________________________  PATIENT: Danyell Orr  29 year old female   : 1990  SCOTTY: January 10, 2020    Consult requested by pcp/other        Outside records reviewed and revealed  - inserted.       History obtained from patient      History of Present Illness  29 year old yo with movement disorder    Not working  She takes care of disabled mother  She " had a heart attack - cardiac arrest 2 years ago  Unemployed.     She is not wearing glasses  Psychiatrist   Jermaine Jerry   psychiatrist  No counselor presently    Has some constipation  Bladder issues - has urgency and frequency  Drinks a lot of water  Hearing is okay  Psoriasis and eczema  Lungs are fine  Heart disease in the family  She does not have problems  Neuro - no headaches  Endo - denies  Heme - denies  Id - denies  Allergies - denies  Sleep - wakes up after falling asleep - wakes up 2 - 3 times per night  She has been on psychiatric medications since age 11 yrs.     Mother is in the waiting room.   Julio Cesar elaine father is in the waiting room  Mother has had cognitive problems since her cardiac arrest    She is unable to recall her medication history    She had developed problems with blinking and keeping her eyes open  It has been there for 3 months and it is better  She thinks that the antipsychotic was the culprit and th is medication was stopped two months ago.     She had been on abilify  For unclear duration  Waylon Fields - was her psychiatrist at [facility] seen by psychiatry 2/14/2019  Danyell has a history of ASD and adjustment disorder.         Medications                    Amphetamine-dextroamphetamine adderall XR 2mg 24 hr capsule        Aripiprazole abilify 5mg         Desogestrel-ethinly estradiol juleber 0.15-30mg-mcg tablet        Fluoxetine prozac 20mg         Hydrocodone-acetaminophen norco 5-325mg         Ibuprofen advil/motrin 600mg         Psyllium                                                                                                                                            Answers for HPI/ROS submitted by the patient on 1/10/2020   General Symptoms: No  Skin Symptoms: No  HENT Symptoms: No  EYE SYMPTOMS: Yes  HEART SYMPTOMS: No  LUNG SYMPTOMS: No  INTESTINAL SYMPTOMS: Yes  URINARY SYMPTOMS: Yes  GYNECOLOGIC SYMPTOMS: No  BREAST SYMPTOMS: No  SKELETAL SYMPTOMS:  Yes  BLOOD SYMPTOMS: No  NERVOUS SYSTEM SYMPTOMS: No  MENTAL HEALTH SYMPTOMS: No  Eye pain: Yes  Vision loss: No  Dry eyes: Yes  Watery eyes: No  Eye bulging: No  Double vision: No  Flashing of lights: No  Spots: Yes  Floaters: No  Redness: No  Crossed eyes: No  Tunnel Vision: No  Yellowing of eyes: No  Eye irritation: Yes  Heart burn or indigestion: No  Nausea: No  Vomiting: No  Abdominal pain: Yes  Bloating: Yes  Constipation: Yes  Diarrhea: Yes  Blood in stool: No  Black stools: No  Rectal or Anal pain: No  Fecal incontinence: No  Yellowing of skin or eyes: No  Vomit with blood: No  Change in stools: No  Trouble holding urine or incontinence: Yes  Pain or burning: No  Trouble starting or stopping: No  Increased frequency of urination: Yes  Blood in urine: No  Decreased frequency of urination: No  Frequent nighttime urination: No  Flank pain: No  Difficulty emptying bladder: No  Back pain: Yes  Muscle aches: No  Neck pain: No  Swollen joints: No  Joint pain: Yes  Bone pain: No  Muscle cramps: No  Muscle weakness: No  Joint stiffness: Yes  Bone fracture: No    14 Review of systems  are negative except for   Patient Active Problem List   Diagnosis     Anxiety     Tardive dyskinesia      No Known Allergies  No past surgical history on file.  Past Medical History:   Diagnosis Date     Tardive dyskinesia 12/17/2019     Social History     Socioeconomic History     Marital status: Single     Spouse name: Not on file     Number of children: Not on file     Years of education: Not on file     Highest education level: Not on file   Occupational History     Not on file   Social Needs     Financial resource strain: Not on file     Food insecurity:     Worry: Not on file     Inability: Not on file     Transportation needs:     Medical: Not on file     Non-medical: Not on file   Tobacco Use     Smoking status: Never Smoker     Smokeless tobacco: Never Used   Substance and Sexual Activity     Alcohol use: No     Drug use: No      Sexual activity: Not Currently   Lifestyle     Physical activity:     Days per week: Not on file     Minutes per session: Not on file     Stress: Not on file   Relationships     Social connections:     Talks on phone: Not on file     Gets together: Not on file     Attends Spiritism service: Not on file     Active member of club or organization: Not on file     Attends meetings of clubs or organizations: Not on file     Relationship status: Not on file     Intimate partner violence:     Fear of current or ex partner: Not on file     Emotionally abused: Not on file     Physically abused: Not on file     Forced sexual activity: Not on file   Other Topics Concern     Parent/sibling w/ CABG, MI or angioplasty before 65F 55M? Not Asked   Social History Narrative    single. lives in [facility]. Debra kohler isak mother     Family History   Problem Relation Age of Onset     Heart Disease Mother      Myocardial Infarction Father      Current Outpatient Medications   Medication Sig Dispense Refill     amoxicillin (AMOXIL) 500 MG capsule Take 500 mg by mouth       amphetamine-dextroamphetamine (ADDERALL) 20 MG tablet take 2 tablet (20 mg) by oral route once daily before breakfast       ARIPiprazole (ABILIFY) 5 MG tablet Take 5 mg by mouth       HYDROcodone-acetaminophen (NORCO) 5-325 MG tablet Take 1 tablet by mouth       ibuprofen (ADVIL/MOTRIN) 600 MG tablet Take 600 mg by mouth       PSYLLIUM PO Take 1 tsp. by mouth       amphetamine-dextroamphetamine (ADDERALL XR) 20 MG 24 hr capsule Take 2 capsules (40 mg) by mouth daily 60 capsule 0     [START ON 1/15/2020] amphetamine-dextroamphetamine (ADDERALL XR) 20 MG 24 hr capsule Take 2 capsules (40 mg) by mouth daily 60 capsule 0     amphetamine-dextroamphetamine (ADDERALL XR) 20 MG 24 hr capsule Take 2 capsules (40 mg) by mouth daily 60 capsule 0     desogestrel-ethinyl estradiol (JULEBER) 0.15-30 MG-MCG tablet Take 1 tablet by mouth daily 84 tablet 3     FLUoxetine (PROZAC) 20  MG capsule Take 1 capsule (20 mg) by mouth daily 90 capsule 0     Examination  B/P: Data Unavailable, T: Data Unavailable, P: Data Unavailable, R: Data Unavailable 0 lbs 0 oz  There were no vitals taken for this visit., There is no height or weight on file to calculate BMI.    Vitals signs were added and reviewed if not above. Please refer to the chart from this visit.    General examination: well developed, nourished and normal affect  Carotid: No bruits. Chest CTA, Heart regular without gallops or murmurs. Abdomen soft nontender, no masses, bowel sounds intact. Periphery: normal pulses without edema. No skin lesions. MENTAL STATUS:  Alert, oriented x3.  Speech fluent with normal naming, repetition, comprehension.  Good right-left orientation, Can remember 2/3 objects.  5/5 on spelling world backwards. Needed two trials CRANIAL NERVES:  Disks flat. Pupils are equal, round, reactive to light.  Normal vascularity and fields. Extraocular movements full.  Facial sensation and movement normal.  Hearing intact. Palate moves symmetrically.  Tongue midline.  Sternocleidomastoid and trapezius strength intact.  Neck strength was normal.  NEUROLOGIC:  Tone: normal. Motor in upper and lower extremities. 5/5.  Reflexes 2/4.  Toe signs downgoing.  Good finger-nose-finger, fine finger movement, heel-shin maneuver, sensation to light touch, position sense and vibration and temperature was normal. Gait normal. Romberg and postural stability intact. Tremor normal      Again, thank you for allowing me to participate in the care of your patient.      Sincerely,    Raghu Spence MD

## 2020-01-16 ENCOUNTER — OFFICE VISIT (OUTPATIENT)
Dept: PSYCHIATRY | Facility: CLINIC | Age: 30
End: 2020-01-16
Attending: NURSE PRACTITIONER
Payer: COMMERCIAL

## 2020-01-16 VITALS
HEART RATE: 93 BPM | WEIGHT: 162.8 LBS | DIASTOLIC BLOOD PRESSURE: 82 MMHG | SYSTOLIC BLOOD PRESSURE: 132 MMHG | BODY MASS INDEX: 30.76 KG/M2

## 2020-01-16 DIAGNOSIS — F84.0 ACTIVE AUTISTIC DISORDER: ICD-10-CM

## 2020-01-16 PROCEDURE — G0463 HOSPITAL OUTPT CLINIC VISIT: HCPCS | Mod: ZF

## 2020-01-16 RX ORDER — DEXTROAMPHETAMINE SACCHARATE, AMPHETAMINE ASPARTATE MONOHYDRATE, DEXTROAMPHETAMINE SULFATE AND AMPHETAMINE SULFATE 5; 5; 5; 5 MG/1; MG/1; MG/1; MG/1
40 CAPSULE, EXTENDED RELEASE ORAL DAILY
Qty: 60 CAPSULE | Refills: 0 | Status: SHIPPED | OUTPATIENT
Start: 2020-03-15 | End: 2020-04-03

## 2020-01-16 RX ORDER — DEXTROAMPHETAMINE SACCHARATE, AMPHETAMINE ASPARTATE MONOHYDRATE, DEXTROAMPHETAMINE SULFATE AND AMPHETAMINE SULFATE 5; 5; 5; 5 MG/1; MG/1; MG/1; MG/1
40 CAPSULE, EXTENDED RELEASE ORAL DAILY
Qty: 60 CAPSULE | Refills: 0 | Status: SHIPPED | OUTPATIENT
Start: 2020-02-14 | End: 2020-05-07

## 2020-01-16 ASSESSMENT — PAIN SCALES - GENERAL: PAINLEVEL: NO PAIN (0)

## 2020-01-16 NOTE — PROGRESS NOTES
"  Psychiatry Clinic Progress Note                                                                   Danyell Orr is a 29 year old female who returns to the clinic for follow-up care  Therapist: None  PCP: Raissa Garibay  Other Providers: None    Pertinent Background:  See previous notes.  Psych critical item history includes trauma hx.     Interim History                                                                                                        4, 4     The patient is a good historian, reports good treatment adherence and was last seen 11/22/19.  Since the last visit, Danyell reports she is doing \"well.\"  Danyell does not endorse concern regarding excessively blinking which was an issue in October.  Stopped since Abilify discontinued.  Sleep is \"ok.\"  Continues to socialize with friends.  Saw neurologist on 1/10/19.  Overall, Danyell is doing really well.      11/22/19: Danyell reports blinking has stopped after Abilify was discontinued.  No blinking observed during appointment.  She is unsure how many days after discontinuation the symptoms subsided.  Danyell has not reported any worsening of mental health symptoms.  Affect bright and appropriately talkative.  Sleep \"ok\" but not a concern.  Adderall continues to be helpful.      8/30/19: Danyell reports she continues to do well.  Very bright affect and appropriately talkative.  No concerns with symptoms.  Continues to spend time socializing with friends.  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  Adderall continues to be effective with management of attention. No concerns with sleep, anxiety, and appetite suppression.        6/14/19: Danyell reports she is \"good!\"  Affect quite bright today and talkative.  Again reports no significant changes.  She talks with her brother frequently.  Danyell reports he sleep did improve after changing when she takes her Abilify and Prozac from bedtime to morning.      3/22/19: Danyell reports she is doing \"good.\"  No " significant changes since last visit.  Her brother, Onesimo, moved to [place] and she reports she is managing change.  She does report difficulty staying asleep and is probably attributable to her taking Prozac and Abilify at bedtime.  Advised her to take in the morning.      Recent Symptoms:   Depression:  low energy and insomnia  Elevated:  none  Psychosis:  none  Anxiety:  not endorsed  Panic Attack:  none  Trauma Related:  none     Recent Substance Use:  Alcohol- no , Tobacco- no , Caffeine- coffee/ tea [1 cup], Opioids- no    Narcan Kit- N/A , Cannabis- no  and Other Illicit Drugs-none        Social/ Family History                                  [per patient report]                                 1ea,1ea   FINANCIAL SUPPORT- not working.  Has not worked since graduating from high school    CHILDREN- None       LIVING SITUATION- Lives with mother and stepfather in [place], [place]      LEGAL- None  EARLY HISTORY/ EDUCATION- Grew up in [place], [place].  Graduated from high school.  Received special education accommodations while in high school  SOCIAL/ SPIRITUAL SUPPORT- close friends and brother       TRAUMA HISTORY (self-report)- None  FEELS SAFE AT HOME- Yes  FAMILY HISTORY-  unknown    Medical / Surgical History                                                                                                                  Patient Active Problem List   Diagnosis     Anxiety     Tardive dyskinesia     Major depressive disorder     Eczema     Insomnia     Psoriasis       Past Surgical History:   Procedure Laterality Date     C ORAL SURGERY PROCEDURE      wisdom teeth  - had a tooth pulled down        Medical Review of Systems                                                                                                    2,10   The remainder of the review of systems is noncontributory  Allergy                                Patient has no known allergies.  Current Medications                                                                                                        Current Outpatient Medications   Medication Sig Dispense Refill     amphetamine-dextroamphetamine (ADDERALL XR) 20 MG 24 hr capsule Take 2 capsules (40 mg) by mouth daily 60 capsule 0     desogestrel-ethinyl estradiol (JULEBER) 0.15-30 MG-MCG tablet Take 1 tablet by mouth daily 84 tablet 3     FLUoxetine (PROZAC) 20 MG capsule Take 1 capsule (20 mg) by mouth daily 90 capsule 0     ibuprofen (ADVIL/MOTRIN) 600 MG tablet Take 600 mg by mouth every 6 hours as needed        PSYLLIUM PO Take 1 tsp. by mouth daily as needed        Vitals                                                                                                                       3, 3   /82   Pulse 93   Wt 73.8 kg (162 lb 12.8 oz)   BMI 30.76 kg/m     Mental Status Exam                                                                                    9, 14 cog gs     Alertness: alert  and oriented  Appearance: casually groomed  Behavior/Demeanor: cooperative, pleasant and calm, with fair  eye contact   Speech: regular rate and rhythm  Language: intact  Psychomotor: normal or unremarkable  Mood: description consistent with euthymia  Affect: appropriate; was congruent to mood; was congruent to content  Thought Process/Associations: unremarkable  Thought Content:  Reports none;  Denies suicidal ideation and violent ideation  Perception:  Reports none;  Denies auditory hallucinations and visual hallucinations  Insight: adequate  Judgment: good  Cognition: (6) does  appear grossly intact; formal cognitive testing was not done  Gait/Station and/or Muscle Strength/Tone: unremarkable    Labs and Data                                                                                                                 Rating Scales:    PHQ9    PHQ9 Today:  2  PHQ-9 SCORE 3/22/2019 6/14/2019 8/30/2019   PHQ-9 Total Score 4 1 0         Diagnosis and Assessment                                                                              m2, h3     Today the following issues were addressed:    1) Major Depressive Disorder, recurrent, full remission  2) Autism Spectrum Disorder (Hx)     [place] Prescription Monitoring Program [] was checked today:  indicates Danyell has been regularly filling Adderall XR 20mg #60 for past 12 months.       Plan                                                                                                                    m2, h3      1) Medication Management  Continue Adderall XR 40mg daily  Continue Prozac 20mg daily    2) Referral   Neurology if EPSE symptoms re-emerge     RTC: 3 months  CRISIS NUMBERS:   Provided routinely in AVS.    Treatment Risk Statement:  The patient understands the risks, benefits, adverse effects and alternatives. Agrees to treatment with the capacity to do so. No medical contraindications to treatment. Agrees to call clinic for any problems. The patient understands to call 911 or go to the nearest ED if life threatening or urgent symptoms occur.      PROVIDER:  ZBIGNIEW Murphy CNP

## 2020-02-10 ENCOUNTER — HEALTH MAINTENANCE LETTER (OUTPATIENT)
Age: 30
End: 2020-02-10

## 2020-02-17 ENCOUNTER — TELEPHONE (OUTPATIENT)
Dept: NEUROPSYCHOLOGY | Facility: CLINIC | Age: 30
End: 2020-02-17

## 2020-02-17 NOTE — TELEPHONE ENCOUNTER
Called left vm and cb number   ----- Message from Aileen Soto, PhD LP sent at 2/17/2020  3:05 PM CST -----  Regarding: appointment  Appointment: first available  Neuropsychologist: Charles  Reason For Referral: memory  Notes: n/a

## 2020-02-21 ENCOUNTER — HOSPITAL ENCOUNTER (OUTPATIENT)
Dept: CARDIOLOGY | Facility: CLINIC | Age: 30
Discharge: HOME | End: 2020-02-21
Attending: PSYCHIATRY & NEUROLOGY | Admitting: PSYCHIATRY & NEUROLOGY
Payer: COMMERCIAL

## 2020-02-21 DIAGNOSIS — G24.01 TARDIVE DYSKINESIA: ICD-10-CM

## 2020-02-21 DIAGNOSIS — F32.0 CURRENT MILD EPISODE OF MAJOR DEPRESSIVE DISORDER WITHOUT PRIOR EPISODE (H): ICD-10-CM

## 2020-02-21 DIAGNOSIS — F84.0 AUTISM SPECTRUM DISORDER: ICD-10-CM

## 2020-02-21 DIAGNOSIS — F84.0 AUTISTIC DISORDER, RESIDUAL STATE: ICD-10-CM

## 2020-02-21 DIAGNOSIS — G24.01 DYSKINESIA, TARDIVE: Primary | ICD-10-CM

## 2020-02-21 DIAGNOSIS — G24.01 DYSKINESIA, TARDIVE: ICD-10-CM

## 2020-02-21 PROCEDURE — 93005 ELECTROCARDIOGRAM TRACING: CPT

## 2020-04-03 ENCOUNTER — VIRTUAL VISIT (OUTPATIENT)
Dept: PSYCHIATRY | Facility: CLINIC | Age: 30
End: 2020-04-03
Attending: NURSE PRACTITIONER
Payer: COMMERCIAL

## 2020-04-03 DIAGNOSIS — F32.5 MAJOR DEPRESSION IN COMPLETE REMISSION (H): ICD-10-CM

## 2020-04-03 DIAGNOSIS — F84.0 ACTIVE AUTISTIC DISORDER: ICD-10-CM

## 2020-04-03 RX ORDER — DEXTROAMPHETAMINE SACCHARATE, AMPHETAMINE ASPARTATE MONOHYDRATE, DEXTROAMPHETAMINE SULFATE AND AMPHETAMINE SULFATE 5; 5; 5; 5 MG/1; MG/1; MG/1; MG/1
40 CAPSULE, EXTENDED RELEASE ORAL DAILY
Qty: 60 CAPSULE | Refills: 0 | Status: SHIPPED | OUTPATIENT
Start: 2020-04-14 | End: 2020-08-11

## 2020-04-03 RX ORDER — FLUOXETINE 10 MG/1
10 CAPSULE ORAL DAILY
Qty: 30 CAPSULE | Refills: 0 | Status: SHIPPED | OUTPATIENT
Start: 2020-04-03 | End: 2020-05-07

## 2020-04-03 NOTE — PROGRESS NOTES
"  Psychiatry Clinic Progress Note                                                                   Danyell Orr is a 29 year old female who returns to the clinic for follow-up care  Therapist: None  PCP: Raissa Garibay  Other Providers: None    Pertinent Background:  See previous notes.  Psych critical item history includes trauma hx.     Interim History                                                                                                        4, 4     The patient is a good historian, reports good treatment adherence and was last seen 1/16/20.  Since the last visit, Danyell reports she's doing as \"good as I can now.\" Stress and anxiety have increased due Covid 19 pandemic.  She misses socializing with her friends.  No concerns with uncontrollable blinking that was issue last fall.  Sleep is not a concern.  She is requesting an increase in antidepressant.      1/16/20: Danyell reports she is doing \"well.\"  Danyell does not endorse concern regarding excessively blinking which was an issue in October.  Stopped since Abilify discontinued.  Sleep is \"ok.\"  Continues to socialize with friends.  Saw neurologist on 1/10/19.  Overall, Danyell is doing really well.      11/22/19: Danyell reports blinking has stopped after Abilify was discontinued.  No blinking observed during appointment.  She is unsure how many days after discontinuation the symptoms subsided.  Danyell has not reported any worsening of mental health symptoms.  Affect bright and appropriately talkative.  Sleep \"ok\" but not a concern.  Adderall continues to be helpful.      8/30/19: Danyell reports she continues to do well.  Very bright affect and appropriately talkative.  No concerns with symptoms.  Continues to spend time socializing with friends.  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  Adderall continues to be effective with management of attention. No concerns with sleep, anxiety, and appetite suppression.        6/14/19: Danyell reports " "she is \"good!\"  Affect quite bright today and talkative.  Again reports no significant changes.  She talks with her brother frequently.  Danyell reports he sleep did improve after changing when she takes her Abilify and Prozac from bedtime to morning.      3/22/19: Danyell reports she is doing \"good.\"  No significant changes since last visit.  Her brother, Onesimo, moved to [place] and she reports she is managing change.  She does report difficulty staying asleep and is probably attributable to her taking Prozac and Abilify at bedtime.  Advised her to take in the morning.      Recent Symptoms:   Depression:  depressed mood, low energy and insomnia  Elevated:  none  Psychosis:  none  Anxiety:  frequent worry  Panic Attack:  none  Trauma Related:  none     Recent Substance Use:  Alcohol- no , Tobacco- no , Caffeine- coffee/ tea [1 cup], Opioids- no    Narcan Kit- N/A , Cannabis- no  and Other Illicit Drugs-none        Social/ Family History                                  [per patient report]                                 1ea,1ea   FINANCIAL SUPPORT- not working.  Has not worked since graduating from high school    CHILDREN- None       LIVING SITUATION- Lives with mother and stepfather in [place], [place]      LEGAL- None  EARLY HISTORY/ EDUCATION- Grew up in [place], [place].  Graduated from high school.  Received special education accommodations while in high school  SOCIAL/ SPIRITUAL SUPPORT- close friends and brother       TRAUMA HISTORY (self-report)- None  FEELS SAFE AT HOME- Yes  FAMILY HISTORY-  unknown    Medical / Surgical History                                                                                                                  Patient Active Problem List   Diagnosis     Anxiety     Tardive dyskinesia     Major depressive disorder     Eczema     Insomnia     Psoriasis       Past Surgical History:   Procedure Laterality Date     C ORAL SURGERY PROCEDURE      wisdom teeth  - had a tooth pulled down        Medical " Review of Systems                                                                                                    2,10   The remainder of the review of systems is noncontributory  Allergy                                Patient has no known allergies.  Current Medications                                                                                                       Current Outpatient Medications   Medication Sig Dispense Refill     amphetamine-dextroamphetamine (ADDERALL XR) 20 MG 24 hr capsule Take 2 capsules (40 mg) by mouth daily 60 capsule 0     amphetamine-dextroamphetamine (ADDERALL XR) 20 MG 24 hr capsule Take 2 capsules (40 mg) by mouth daily 60 capsule 0     desogestrel-ethinyl estradiol (JULEBER) 0.15-30 MG-MCG tablet Take 1 tablet by mouth daily 84 tablet 3     FLUoxetine (PROZAC) 20 MG capsule Take 1 capsule (20 mg) by mouth daily 90 capsule 0     ibuprofen (ADVIL/MOTRIN) 600 MG tablet Take 600 mg by mouth every 6 hours as needed        PSYLLIUM PO Take 1 tsp. by mouth daily as needed        Vitals                                                                                                                       3, 3   There were no vitals taken for this visit.   Mental Status Exam                                                                                    9, 14 cog gs     Alertness: alert  and oriented  Appearance: casually groomed  Behavior/Demeanor: cooperative and pleasant, with fair  eye contact   Speech: regular rate and rhythm  Language: intact  Psychomotor: normal or unremarkable  Mood: anxious and worried  Affect: appropriate; was congruent to mood; was congruent to content  Thought Process/Associations: unremarkable  Thought Content:  Reports none;  Denies suicidal ideation and violent ideation  Perception:  Reports none;  Denies auditory hallucinations and visual hallucinations  Insight: adequate  Judgment: good  Cognition: (6) does  appear grossly intact; formal cognitive  "testing was not done  Gait/Station and/or Muscle Strength/Tone: unable to assess    Labs and Data                                                                                                                 Rating Scales:    PHQ9    PHQ9 Today:  Not completed    PHQ-9 SCORE 3/22/2019 6/14/2019 8/30/2019   PHQ-9 Total Score 4 1 0         Diagnosis and Assessment                                                                             m2, h3     Today the following issues were addressed:    1) Major Depressive Disorder, recurrent, full remission  2) Autism Spectrum Disorder (Hx)     [place] Prescription Monitoring Program [] was checked today:  indicates Danyell has been regularly filling Adderall XR 20mg #60 for past 12 months.       Plan                                                                                                                    m2, h3      1) Medication Management  Continue Adderall XR 40mg daily  Increase Prozac to 30mg daily    2) Referral   Neurology if EPSE symptoms re-emerge     RTC: 3 months  CRISIS NUMBERS:   Provided routinely in AVS.    Treatment Risk Statement:  The patient understands the risks, benefits, adverse effects and alternatives. Agrees to treatment with the capacity to do so. No medical contraindications to treatment. Agrees to call clinic for any problems. The patient understands to call 911 or go to the nearest ED if life threatening or urgent symptoms occur.      PROVIDER:  ZBIGNIEW Murphy CNP    VIDEO VISIT  Danyell Orr is a 29 year old patient who is being evaluated via a billable video visit.      The patient has been notified of following:   \"We have found that certain health care needs can be provided without the need for an in-person physical exam. This service lets us provide the care you need with a video conversation. If a prescription is necessary we can send it directly to your pharmacy. If lab work is needed we can place an order for that and you " can then stop by our lab to have the test done at a later time. Insurers are generally covering virtual visits as they would in-office visits so billing should not be different than normal.  If for some reason you do get billed incorrectly, you should contact the billing office to correct it and that number is in the AVS .    Patient has given verbal consent for video visit?: Yes   How would you like to obtain your AVS?: not requested  AVS SmartPhrase [PsychAVS] has been placed in 'Patient Instructions': unknown      Video- Visit Details  Type of service:  video visit for medication management  Time of service:    Date:  05/04/2020    Video Start Time:  10:33am        Video End Time:  10:46am    Reason for video visit:  Patient unable to travel due to Covid-19  Originating Site (patient location):  Patient's home  Distant Site (provider location):  Remote location  Mode of Communication:  Video Conference via [email or web address]

## 2020-04-13 ENCOUNTER — VIRTUAL VISIT (OUTPATIENT)
Dept: NEUROLOGY | Facility: CLINIC | Age: 30
End: 2020-04-13
Payer: COMMERCIAL

## 2020-04-13 DIAGNOSIS — F39 MOOD DISORDER (H): ICD-10-CM

## 2020-04-13 DIAGNOSIS — R25.9 ABNORMAL INVOLUNTARY MOVEMENT: Primary | ICD-10-CM

## 2020-04-13 NOTE — PROGRESS NOTES
"Danyell Orr is a 29 year old female who is being evaluated via a billable video visit.      The patient has been notified of following:     \"This video visit will be conducted via a call between you and your physician/provider. We have found that certain health care needs can be provided without the need for an in-person physical exam.  This service lets us provide the care you need with a video conversation.  If a prescription is necessary we can send it directly to your pharmacy.  If lab work is needed we can place an order for that and you can then stop by our lab to have the test done at a later time.    Video visits are billed at different rates depending on your insurance coverage.  Please reach out to your insurance provider with any questions.    If during the course of the call the physician/provider feels a video visit is not appropriate, you will not be charged for this service.\"    Patient has given verbal consent for Video visit? Yes    How would you like to obtain your AVS? Ailynharodilon    Patient would like the video invitation sent by: Text to cell phone: [phone]      Video-Visit Details    Type of service:  Video Visit    Originating Location (pt. Location): Home    Distant Location (provider location):  [facility]     Mode of Communication:  Video Conference [facility]    MOVEMENT DISORDERS VIDEO VISIT:     PATIENT: Danyell Orr    : 1990    DATE: 2020    REASON FOR VISIT: Drug-induced involuntary movements follow up.    After a review of the patient s situation, this visit was changed from an in-person visit to a video visit to reduce the risk of COVID-19 exposure.    Patient was seen by Dr. Spence on 1/10/2020 for evaluation of  involuntary movements.     Patient has a longstanding history of mood disorder with anxiety and depression.  She was on Abilify for depression.  \"I was cutting myself.\"    Patient reports that the involuntary eye blinking has gradually " improved.    She was having a hard time to keep her eyes open.  She was on Abilify and was stopped by her psychiatrist at the end of September 2019.  She is now on Fluoxetine.      Dr. Spence had recommended neuropsychological evaluation.  Patient did not complete the evaluation.  She reports that there is no need to do the test.  She also asked her parents who agreed that there is no need to do the neuropsych evaluation.    MEDICATIONS:   Outpatient Medications Marked as Taking for the 4/13/20 encounter (Virtual Visit) with April Rosario APRN CNP   Medication Sig     [START ON 4/14/2020] amphetamine-dextroamphetamine (ADDERALL XR) 20 MG 24 hr capsule Take 2 capsules (40 mg) by mouth daily     amphetamine-dextroamphetamine (ADDERALL XR) 20 MG 24 hr capsule Take 2 capsules (40 mg) by mouth daily     desogestrel-ethinyl estradiol (JULEBER) 0.15-30 MG-MCG tablet Take 1 tablet by mouth daily     FLUoxetine (PROZAC) 10 MG capsule Take 1 capsule (10 mg) by mouth daily In addition to 20mg capsule for total daily dose of 30mg     FLUoxetine (PROZAC) 20 MG capsule Take 1 capsule (20 mg) by mouth daily In addition to 10mg capsule for total daily dose of 30mg     ibuprofen (ADVIL/MOTRIN) 600 MG tablet Take 600 mg by mouth every 6 hours as needed      PSYLLIUM PO Take 1 tsp. by mouth daily as needed        ALLERGIES: Patient has no known allergies.    Patient reports that no new changes in medical history, surgical Hx, family Hx, and social Hx.    ASSESSMENT:    1)  Abnormal Involuntary Movements:  Most likely due to drug-induced.  After Abilify was stopped, the involuntary eye blinking has gradually improved and almost resolved.     2)  Mood Disorder:  Managed by psychiatry.       PLAN:    __  Patient was informed that if involuntary movements worsen, to return to our clinic.     __  Since symptoms have improved and almost resolved, no treatment needed.    __  No future appointment made.    Total video time was 12  minutes.  Greater than 50% of this time was spent in therapeutic plan, counseling, and coordination of care.     ZBIGNIEW Rich,  CNP  [facility]      Video Time:   Start: 04/13/2020 10:32 am   Stop: 04/13/2020 10:44 am

## 2020-04-13 NOTE — LETTER
"2020      RE: Danyell Orr  [address]  [place] [address]       Danyell Orr is a 29 year old female who is being evaluated via a billable video visit.      The patient has been notified of following:     \"This video visit will be conducted via a call between you and your physician/provider. We have found that certain health care needs can be provided without the need for an in-person physical exam.  This service lets us provide the care you need with a video conversation.  If a prescription is necessary we can send it directly to your pharmacy.  If lab work is needed we can place an order for that and you can then stop by our lab to have the test done at a later time.    Video visits are billed at different rates depending on your insurance coverage.  Please reach out to your insurance provider with any questions.    If during the course of the call the physician/provider feels a video visit is not appropriate, you will not be charged for this service.\"    Patient has given verbal consent for Video visit? Yes    How would you like to obtain your AVS? [facility]    Patient would like the video invitation sent by: Text to cell phone: [phone]      Video-Visit Details    Type of service:  Video Visit    Originating Location (pt. Location): Home    Distant Location (provider location):  [facility] NEUROLOGY     Mode of Communication:  Video Conference via [facility]    MOVEMENT DISORDERS VIDEO VISIT:     PATIENT: Danyell Orr    : 1990    DATE: 2020    REASON FOR VISIT: Drug-induced involuntary movements follow up.    After a review of the patient s situation, this visit was changed from an in-person visit to a video visit to reduce the risk of COVID-19 exposure.    Patient was seen by Dr. Spence on 1/10/2020 for evaluation of  involuntary movements.     Patient has a longstanding history of mood disorder with anxiety and depression.  She was on Abilify for depression.  \"I was " "cutting myself.\"    Patient reports that the involuntary eye blinking has gradually improved.    She was having a hard time to keep her eyes open.  She was on Abilify and was stopped by her psychiatrist at the end of September 2019.  She is now on Fluoxetine.      Dr. Spence had recommended neuropsychological evaluation.  Patient did not complete the evaluation.  She reports that there is no need to do the test.  She also asked her parents who agreed that there is no need to do the neuropsych evaluation.    MEDICATIONS:   Outpatient Medications Marked as Taking for the 4/13/20 encounter (Virtual Visit) with April Rosario APRN CNP   Medication Sig     [START ON 4/14/2020] amphetamine-dextroamphetamine (ADDERALL XR) 20 MG 24 hr capsule Take 2 capsules (40 mg) by mouth daily     amphetamine-dextroamphetamine (ADDERALL XR) 20 MG 24 hr capsule Take 2 capsules (40 mg) by mouth daily     desogestrel-ethinyl estradiol (JULEBER) 0.15-30 MG-MCG tablet Take 1 tablet by mouth daily     FLUoxetine (PROZAC) 10 MG capsule Take 1 capsule (10 mg) by mouth daily In addition to 20mg capsule for total daily dose of 30mg     FLUoxetine (PROZAC) 20 MG capsule Take 1 capsule (20 mg) by mouth daily In addition to 10mg capsule for total daily dose of 30mg     ibuprofen (ADVIL/MOTRIN) 600 MG tablet Take 600 mg by mouth every 6 hours as needed      PSYLLIUM PO Take 1 tsp. by mouth daily as needed        ALLERGIES: Patient has no known allergies.    Patient reports that no new changes in medical history, surgical Hx, family Hx, and social Hx.    ASSESSMENT:    1)  Abnormal Involuntary Movements:  Most likely due to drug-induced.  After Abilify was stopped, the involuntary eye blinking has gradually improved and almost resolved.     2)  Mood Disorder:  Managed by psychiatry.       PLAN:    __  Patient was informed that if involuntary movements worsen, to return to our clinic.     __  Since symptoms have improved and almost resolved, no " treatment needed.    __  No future appointment made.    Total video time was 12 minutes.  Greater than 50% of this time was spent in therapeutic plan, counseling, and coordination of care.     ZBIGNIEW Rich,  CNP  [facility]      Video Time:   Start: 04/13/2020 10:32 am   Stop: 04/13/2020 10:44 am        ZBIGNIEW Miller CNP

## 2020-04-14 NOTE — PATIENT INSTRUCTIONS
April 14, 2020    Dear Ms. Campoverdea ENEIDA Orr,    Video Visit Summary: -     PLAN:    __  Since your symptoms have improved and almost resolved, no treatment needed.    __  Return to our clinic as needed.      For questions, you may send us a Tailored message or call [phone]    Fax number: [phone]    ZBIGNIEW Rich, CNP  [facility]

## 2020-04-28 ENCOUNTER — OFFICE VISIT (OUTPATIENT)
Dept: FAMILY MEDICINE | Facility: CLINIC | Age: 30
End: 2020-04-28
Payer: COMMERCIAL

## 2020-04-28 VITALS
SYSTOLIC BLOOD PRESSURE: 136 MMHG | HEIGHT: 61 IN | RESPIRATION RATE: 18 BRPM | DIASTOLIC BLOOD PRESSURE: 82 MMHG | WEIGHT: 162.6 LBS | BODY MASS INDEX: 30.7 KG/M2 | TEMPERATURE: 98.4 F | OXYGEN SATURATION: 99 % | HEART RATE: 85 BPM

## 2020-04-28 DIAGNOSIS — L03.032 PARONYCHIA OF TOE, LEFT: Primary | ICD-10-CM

## 2020-04-28 PROCEDURE — 99213 OFFICE O/P EST LOW 20 MIN: CPT | Performed by: PHYSICIAN ASSISTANT

## 2020-04-28 RX ORDER — CEPHALEXIN 500 MG/1
500 CAPSULE ORAL 4 TIMES DAILY
Qty: 20 CAPSULE | Refills: 0 | Status: SHIPPED | OUTPATIENT
Start: 2020-04-28 | End: 2020-05-03

## 2020-04-28 ASSESSMENT — PAIN SCALES - GENERAL: PAINLEVEL: MODERATE PAIN (4)

## 2020-04-28 ASSESSMENT — MIFFLIN-ST. JEOR: SCORE: 1404.93

## 2020-04-28 NOTE — PROGRESS NOTES
Subjective     Danyell Orr is a 29 year old female who presents to clinic today for the following health issues:    HPI   Toe pain       Duration: one day     Description (location/character/radiation): Left foot 4th toe. Patient states that she did pulled out a possible hang nail. Says that toe is very tender, redness and swelling    Intensity:  moderate    Accompanying signs and symptoms: none    History (similar episodes/previous evaluation): None    Precipitating or alleviating factors: None    Therapies tried and outcome: None   Drained pus this morning with hot pack and pressure.     Patient Active Problem List   Diagnosis     Anxiety     Tardive dyskinesia     Major depressive disorder     Eczema     Insomnia     Psoriasis     Past Surgical History:   Procedure Laterality Date     C ORAL SURGERY PROCEDURE      wisdom teeth  - had a tooth pulled down       Social History     Tobacco Use     Smoking status: Never Smoker     Smokeless tobacco: Never Used   Substance Use Topics     Alcohol use: No     Family History   Problem Relation Age of Onset     Heart Disease Mother      Coronary Artery Disease Mother         resucistated but has not been the same     Myocardial Infarction Father      Depression Father      Other - See Comments Sister         [place]     Other - See Comments [facility]     Other - See Comments Other         mother remarried     Other - See Comments Brother         [place] ([facility])         Current Outpatient Medications   Medication Sig Dispense Refill     amphetamine-dextroamphetamine (ADDERALL XR) 20 MG 24 hr capsule Take 2 capsules (40 mg) by mouth daily 60 capsule 0     amphetamine-dextroamphetamine (ADDERALL XR) 20 MG 24 hr capsule Take 2 capsules (40 mg) by mouth daily 60 capsule 0     cephALEXin (KEFLEX) 500 MG capsule Take 1 capsule (500 mg) by mouth 4 times daily for 5 days 20 capsule 0     desogestrel-ethinyl estradiol (JULEBER) 0.15-30 MG-MCG tablet Take 1  "tablet by mouth daily 84 tablet 3     FLUoxetine (PROZAC) 10 MG capsule Take 1 capsule (10 mg) by mouth daily In addition to 20mg capsule for total daily dose of 30mg 30 capsule 0     FLUoxetine (PROZAC) 20 MG capsule Take 1 capsule (20 mg) by mouth daily In addition to 10mg capsule for total daily dose of 30mg 30 capsule 0     ibuprofen (ADVIL/MOTRIN) 600 MG tablet Take 600 mg by mouth every 6 hours as needed        PSYLLIUM PO Take 1 tsp. by mouth daily as needed        No Known Allergies    Reviewed and updated as needed this visit by Provider         Review of Systems   See HPI      Objective    /82 (BP Location: Right arm, Patient Position: Sitting, Cuff Size: Adult Large)   Pulse 85   Temp 98.4  F (36.9  C) (Tympanic)   Resp 18   Ht 1.549 m (5' 1\")   Wt 73.8 kg (162 lb 9.6 oz)   SpO2 99%   BMI 30.72 kg/m    Body mass index is 30.72 kg/m .  Physical Exam   Constitutional: healthy, alert, and no distress  Head: Normocephalic. Atraumatic  Eyes: No conjunctival injection, sclera anicteric  Respiratory: No resp distress.  Musculoskeletal: extremities normal- no gross deformities noted, and normal muscle tone  Neurologic: Gait normal. CN 2-12 grossly intact  Psychiatric: mentation appears normal and affect normal/bright  Skin: Left 4th toe erythematous along the medial nailbed. Mildly fluctuant. Tender to palpation. Erythema extends just passed the DIP proximally.     Diagnostic Test Results:  none       Assessment & Plan   (L03.032) Paronychia of toe, left  (primary encounter diagnosis)  Comment: Exam consistent with paronychia. Already draining. Rx for Keflex. Encouraged warm compresses, keeping clean and dry. RTC prn for any new, changing or worsening symptoms.    Plan: cephALEXin (KEFLEX) 500 MG capsule    Return in about 1 week (around 5/5/2020), or if symptoms worsen or fail to improve.    Elmo Crawley PA-C  [facility]      "

## 2020-04-28 NOTE — PATIENT INSTRUCTIONS
Take your antibiotics as prescribed.     Follow cleaning and care instructions below/on the next pages.     Patient Education     Paronychia of the Finger or Toe  Paronychia is an infection near a fingernail or toenail. It usually occurs when an opening in the cuticle or an ingrown toenail lets bacteria under the skin.  The infection will need to be drained if pus is present. If the infection has been caught early, you may need only antibiotic treatment. Healing will take about 1 to 2 weeks.  Home care  Follow these guidelines when caring for yourself at home:    Clean and soak the toe or finger. Do this 2 times a day for the first 3 days. To do so:  ? Soak your foot or hand in a tub of warm water for 5 minutes. Or hold your toe or finger under a faucet of warm running water for 5 minutes.  ? Clean any crust away with soap and water using a cotton swab.  ? Put antibiotic ointment on the infected area.    Change the dressing daily or any time it gets dirty.    If you were given antibiotics, take them as directed until they are all gone.    If your infection is on a toe, wear comfortable shoes with a lot of toe room. You can also wear open-toed sandals while your toe heals.    You may use over-the-counter medicine (acetaminophen or ibuprofen to help with pain, unless another medicine was prescribed. If you have chronic liver or kidney disease, talk with your healthcare provider before using these medicines. Also talk with your provider if you've had a stomach ulcer or GI (gastrointestinal) bleeding.  Prevention  The following can prevent paronychia:    Avoid cutting or playing with your cuticles at home.    Don't bite your nails.    Don't suck on your thumbs or fingers.  Follow-up care  Follow up with your healthcare provider, or as advised.  When to seek medical advice  Call your healthcare provider right away if any of these occur:    Redness, pain, or swelling of the finger or toe gets worse    Red streaks in the  skin leading away from the wound    Pus or fluid draining from the nail area    Fever of 100.4 F (38 C) or higher, or as directed by your provider  Date Last Reviewed: 8/1/2016 2000-2019 [employer]. [address] [place] [address], [place] [address]. All rights reserved. This information is not intended as a substitute for professional medical care. Always follow your healthcare professional's instructions.

## 2020-05-06 DIAGNOSIS — F84.0 ACTIVE AUTISTIC DISORDER: ICD-10-CM

## 2020-05-06 DIAGNOSIS — F32.5 MAJOR DEPRESSION IN COMPLETE REMISSION (H): ICD-10-CM

## 2020-05-07 RX ORDER — FLUOXETINE 10 MG/1
10 CAPSULE ORAL DAILY
Qty: 30 CAPSULE | Refills: 0 | Status: SHIPPED | OUTPATIENT
Start: 2020-05-07 | End: 2020-06-03

## 2020-05-07 RX ORDER — DEXTROAMPHETAMINE SACCHARATE, AMPHETAMINE ASPARTATE MONOHYDRATE, DEXTROAMPHETAMINE SULFATE AND AMPHETAMINE SULFATE 5; 5; 5; 5 MG/1; MG/1; MG/1; MG/1
40 CAPSULE, EXTENDED RELEASE ORAL DAILY
Qty: 60 CAPSULE | Refills: 0 | Status: SHIPPED | OUTPATIENT
Start: 2020-05-07 | End: 2020-06-03

## 2020-06-03 ENCOUNTER — VIRTUAL VISIT (OUTPATIENT)
Dept: PSYCHIATRY | Facility: CLINIC | Age: 30
End: 2020-06-03
Attending: NURSE PRACTITIONER
Payer: COMMERCIAL

## 2020-06-03 DIAGNOSIS — F32.5 MAJOR DEPRESSION IN COMPLETE REMISSION (H): ICD-10-CM

## 2020-06-03 DIAGNOSIS — F84.0 ACTIVE AUTISTIC DISORDER: ICD-10-CM

## 2020-06-03 RX ORDER — FLUOXETINE 10 MG/1
10 CAPSULE ORAL DAILY
Qty: 60 CAPSULE | Refills: 0 | Status: SHIPPED | OUTPATIENT
Start: 2020-06-03 | End: 2020-08-11

## 2020-06-03 RX ORDER — DEXTROAMPHETAMINE SACCHARATE, AMPHETAMINE ASPARTATE MONOHYDRATE, DEXTROAMPHETAMINE SULFATE AND AMPHETAMINE SULFATE 5; 5; 5; 5 MG/1; MG/1; MG/1; MG/1
40 CAPSULE, EXTENDED RELEASE ORAL DAILY
Qty: 60 CAPSULE | Refills: 0 | Status: SHIPPED | OUTPATIENT
Start: 2020-07-03 | End: 2020-11-10

## 2020-06-03 RX ORDER — DEXTROAMPHETAMINE SACCHARATE, AMPHETAMINE ASPARTATE MONOHYDRATE, DEXTROAMPHETAMINE SULFATE AND AMPHETAMINE SULFATE 5; 5; 5; 5 MG/1; MG/1; MG/1; MG/1
40 CAPSULE, EXTENDED RELEASE ORAL DAILY
Qty: 60 CAPSULE | Refills: 0 | Status: SHIPPED | OUTPATIENT
Start: 2020-06-03 | End: 2021-04-20

## 2020-06-03 ASSESSMENT — PAIN SCALES - GENERAL: PAINLEVEL: NO PAIN (0)

## 2020-06-03 NOTE — PATIENT INSTRUCTIONS
Thank you for coming to the PSYCHIATRY CLINIC.    Lab Testing:  If you had lab testing today and your results are reassuring or normal they will be mailed to you or sent through [employer] within 7 days.   If the lab tests need quick action we will call you with the results.  The phone number we will call with results is # [phone] (home) [phone] (work). If this is not the best number please call our clinic and change the number.    Medication Refills:  If you need any refills please call your pharmacy and they will contact us. Our fax number for refills is [phone]. Please allow three business for refill processing.   If you need to  your refill at a new pharmacy, please contact the new pharmacy directly. The new pharmacy will help you get your medications transferred.     Scheduling:  If you have any concerns about today's visit or wish to schedule another appointment please call our office during normal business hours [phone] (8-5:00 M-F)    Contact Us:  Please call [phone] during business hours (8-5:00 M-F).  If after clinic hours, or on the weekend, please call [phone].    Financial Assistance: [phone]  [phone]  [place] [facility], [facility]: [phone]  [place] [phone]  Medical Records: [phone]    MENTAL HEALTH CRISIS NUMBERS:  [facility]:   [facility]: [phone]  Crisis Residence [facility]: [phone]   [facility]: [phone]   COPE  [place] Mobile Team: [phone] (adults) [phone] (child)     [facility]:   [facility]: [phone]   Walk-in counseling [facility]: [phone]   Walk-in counseling [facility]: [phone]   Crisis Residence [facility] Residence: [phone]   Urgent Care Adult Mental Health: [phone] Mobile team AND  crisis line    Other Crisis Numbers:    National Suicide Prevention Lifeline: 361-026-AMPY (900-164-1908)  CRISIS TEXT LINE: Text to 610064 for any crisis 24/7; OR www.crisistextline.org   Poison Control Center: 6-458-356-8859  CHILD: [facility] needs assessment team: [phone]   Trans Lifeline: 1-340.950.6488  [employer] Lifeline: 1-433.532.3921    For a medical emergency please call 911 or go to the nearest ER.         _____________________________________________    Again thank you for choosing PSYCHIATRY CLINIC and please let us know how we can best partner with you to improve you and your family's health.    You may be receiving a survey regarding this appointment. We would love to have your feedback, both positive and negative. The survey is done by an external company, so your answers are anonymous.

## 2020-06-03 NOTE — PROGRESS NOTES
"VIDEO VISIT  Danyell Orr is a 29 year old patient who is being evaluated via a billable video visit.      The patient has been notified of following:   \"This video visit will be conducted via a call between you and your physician/provider. We have found that certain health care needs can be provided without the need for an in-person physical exam. This service lets us provide the care you need with a video conversation. If a prescription is necessary we can send it directly to your pharmacy. If lab work is needed we can place an order for that and you can then stop by our lab to have the test done at a later time. Insurers are generally covering virtual visits as they would in-office visits so billing should not be different than normal.  If for some reason you do get billed incorrectly, you should contact the billing office to correct it and that number is in the AVS .    Patient has given verbal consent for video visit?:  Yes     How would you like to obtain your AVS?:  [email or web address]    Video invitation for patient:  DOXY provider, invite not needed      AVS SmartPhrase [PsychAVS] has been placed in 'Patient Instructions':  Yes     "

## 2020-08-09 DIAGNOSIS — F32.5 MAJOR DEPRESSION IN COMPLETE REMISSION (H): ICD-10-CM

## 2020-08-09 DIAGNOSIS — F84.0 ACTIVE AUTISTIC DISORDER: ICD-10-CM

## 2020-08-11 RX ORDER — FLUOXETINE 10 MG/1
10 CAPSULE ORAL DAILY
Qty: 60 CAPSULE | Refills: 0 | Status: SHIPPED | OUTPATIENT
Start: 2020-08-11 | End: 2020-10-14

## 2020-08-11 RX ORDER — DEXTROAMPHETAMINE SACCHARATE, AMPHETAMINE ASPARTATE MONOHYDRATE, DEXTROAMPHETAMINE SULFATE AND AMPHETAMINE SULFATE 5; 5; 5; 5 MG/1; MG/1; MG/1; MG/1
40 CAPSULE, EXTENDED RELEASE ORAL DAILY
Qty: 60 CAPSULE | Refills: 0 | Status: SHIPPED | OUTPATIENT
Start: 2020-08-11 | End: 2020-08-26

## 2020-08-26 ENCOUNTER — TELEPHONE (OUTPATIENT)
Dept: PSYCHIATRY | Facility: CLINIC | Age: 30
End: 2020-08-26

## 2020-08-26 ENCOUNTER — VIRTUAL VISIT (OUTPATIENT)
Dept: PSYCHIATRY | Facility: CLINIC | Age: 30
End: 2020-08-26
Attending: NURSE PRACTITIONER
Payer: COMMERCIAL

## 2020-08-26 DIAGNOSIS — F84.0 ACTIVE AUTISTIC DISORDER: ICD-10-CM

## 2020-08-26 DIAGNOSIS — F32.5 MAJOR DEPRESSION IN COMPLETE REMISSION (H): ICD-10-CM

## 2020-08-26 RX ORDER — DEXTROAMPHETAMINE SACCHARATE, AMPHETAMINE ASPARTATE MONOHYDRATE, DEXTROAMPHETAMINE SULFATE AND AMPHETAMINE SULFATE 5; 5; 5; 5 MG/1; MG/1; MG/1; MG/1
40 CAPSULE, EXTENDED RELEASE ORAL DAILY
Qty: 60 CAPSULE | Refills: 0 | Status: SHIPPED | OUTPATIENT
Start: 2020-10-08 | End: 2020-11-10

## 2020-08-26 RX ORDER — DEXTROAMPHETAMINE SACCHARATE, AMPHETAMINE ASPARTATE MONOHYDRATE, DEXTROAMPHETAMINE SULFATE AND AMPHETAMINE SULFATE 5; 5; 5; 5 MG/1; MG/1; MG/1; MG/1
40 CAPSULE, EXTENDED RELEASE ORAL DAILY
Qty: 60 CAPSULE | Refills: 0 | Status: SHIPPED | OUTPATIENT
Start: 2020-09-09 | End: 2020-11-10

## 2020-08-26 RX ORDER — DEXTROAMPHETAMINE SACCHARATE, AMPHETAMINE ASPARTATE MONOHYDRATE, DEXTROAMPHETAMINE SULFATE AND AMPHETAMINE SULFATE 5; 5; 5; 5 MG/1; MG/1; MG/1; MG/1
40 CAPSULE, EXTENDED RELEASE ORAL DAILY
Qty: 60 CAPSULE | Refills: 0 | Status: SHIPPED | OUTPATIENT
Start: 2020-11-06 | End: 2021-04-20

## 2020-08-26 ASSESSMENT — PAIN SCALES - GENERAL: PAINLEVEL: NO PAIN (0)

## 2020-08-26 NOTE — PATIENT INSTRUCTIONS
Thank you for coming to the PSYCHIATRY CLINIC.    Lab Testing:  If you had lab testing today and your results are reassuring or normal they will be mailed to you or sent through [employer] within 7 days. If the lab tests need quick action we will call you with the results. The phone number we will call with results is # [phone] (home) [phone] (work). If this is not the best number please call our clinic and change the number.    Medication Refills:  If you need any refills please call your pharmacy and they will contact us. Our fax number for refills is [phone]. Please allow three business for refill processing. If you need to  your refill at a new pharmacy, please contact the new pharmacy directly. The new pharmacy will help you get your medications transferred.     Scheduling:  If you have any concerns about today's visit or wish to schedule another appointment please call our office during normal business hours [phone] (8-5:00 M-F)    Contact Us:  Please call [phone] during business hours (8-5:00 M-F).  If after clinic hours, or on the weekend, please call  [phone].    Financial Assistance [phone]  [facility] [phone]  [facility]illing [facility], [facility]: [phone]  [facility]illing [phone]  Medical Records [phone]      MENTAL HEALTH CRISIS NUMBERS:  For a medical emergency please call  911 or go to the nearest ER.     [facility]:   [facility] -[phone]   Crisis Residence [facility] -[phone]   Walk-In Counseling [facility] -[phone]   COPE 24/7 [place] Mobile Team -[phone] (adults)/[phone] (child)  CHILD: [facility] needs assessment team - [phone]      [facility]:   [facility] - [phone]   Walk-in counseling [facility] - [phone]   Walk-in counseling [facility] - [phone]   Crisis Residence [facility] -  [phone]  Urgent Care Adult Mental Hqngne-[phone] mobile unit/ 24/7 crisis line    National Crisis Numbers:   National Suicide Prevention Lifeline: 3-608-142-TALK (104-901-2329)  Poison Control Center - 8-017-613-5778  [employer]/resources for a list of additional resources (SOS)  Trans Lifeline a hotline for transgender people 9-754-089-7675  [employer] a hotline for LGBT youth 1-348.751.1043  Crisis Text Line: For any crisis 24/7   To: 994885  see www.crisistextline.org  - IF MAKING A CALL FEELS TOO HARD, send a text!         Again thank you for choosing PSYCHIATRY CLINIC and please let us know how we can best partner with you to improve you and your family's health.    You may be receiving a survey regarding this appointment. We would love to have your feedback, both positive and negative. The survey is done by an external company, so your answers are anonymous.

## 2020-08-26 NOTE — PROGRESS NOTES
"Video- Visit Details  Type of service:  video visit for medication management  Time of service:    Date:  08/26/2020    Video Start Time:  10:36 AM        Video End Time:  10:52am    Reason for video visit:  Patient unable to travel due to Covid-19  Originating Site (patient location):  [facility]   Location- Patient's home  Distant Site (provider location):  Remote location  Mode of Communication:  Video Conference via AmWell  Consent:  Patient has given verbal consent for video visit?: Yes     VIDEO VISIT  Danyell Orr is a 29 year old patient who is being evaluated via a billable video visit.      The patient has been notified of following:   \"This video visit will be conducted via a call between you and your physician/provider. We have found that certain health care needs can be provided without the need for an in-person physical exam. This service lets us provide the care you need with a video conversation. If a prescription is necessary we can send it directly to your pharmacy. If lab work is needed we can place an order for that and you can then stop by our lab to have the test done at a later time. Insurers are generally covering virtual visits as they would in-office visits so billing should not be different than normal.  If for some reason you do get billed incorrectly, you should contact the billing office to correct it and that number is in the AVS .    Video Conference to be completed via:  [email or web address]    Patient has given verbal consent for video visit?:  Yes    Patient would prefer that any video invitations be sent by: Text to cell phone: [phone]      How would patient like to obtain AVS?:  Infomous    AVS SmartPhrase [PsychAVS] has been placed in 'Patient Instructions':  Yes    Psychiatry Clinic Progress Note                                                                   Danyell Orr is a 29 year old female who returns to the clinic for follow-up care  Therapist: None  PCP: Raissa Garibay " "Fracnk  Other Providers: None    Pertinent Background:  See previous notes.  Psych critical item history includes trauma hx.     Interim History                                                                                                        4, 4     The patient is a good historian, reports good treatment adherence and was last seen 6/3/20.  Since the last visit, Danyell reports she is \"doing alright.\"  Continues to socialize with her friends virtually.  Also reading and watching television.  Danyell reports she has lost approximately 30 lbs since stopping Abilify.  Sleep is \"ok.\"  She also shares her excitement about becoming an aunt again.  Danyell also will be moving in October.      6/3/20: Danyell reports the increase in Prozac was helpful as it decreased irritability, improved depression, and decreased anxiety.  No concerns with side effects.  No concerns with sleep.  Continues to socialize with friend fairly regularly.      4/3/20: Danyell reports she's doing as \"good as I can now.\" Stress and anxiety have increased due Covid 19 pandemic.  She misses socializing with her friends.  No concerns with uncontrollable blinking that was issue last fall.  Sleep is not a concern.  She is requesting an increase in antidepressant.      1/16/20: Danyell reports she is doing \"well.\"  Danyell does not endorse concern regarding excessively blinking which was an issue in October.  Stopped since Abilify discontinued.  Sleep is \"ok.\"  Continues to socialize with friends.  Saw neurologist on 1/10/19.  Overall, Danyell is doing really well.      11/22/19: Danyell reports blinking has stopped after Abilify was discontinued.  No blinking observed during appointment.  She is unsure how many days after discontinuation the symptoms subsided.  Danyell has not reported any worsening of mental health symptoms.  Affect bright and appropriately talkative.  Sleep \"ok\" but not a concern.  Adderall continues to be helpful.      8/30/19: Danyell reports she continues to " "do well.  Very bright affect and appropriately talkative.  No concerns with symptoms.  Continues to spend time socializing with friends.  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  Adderall continues to be effective with management of attention. No concerns with sleep, anxiety, and appetite suppression.        6/14/19: Danyell reports she is \"good!\"  Affect quite bright today and talkative.  Again reports no significant changes.  She talks with her brother frequently.  Danyell reports he sleep did improve after changing when she takes her Abilify and Prozac from bedtime to morning.      3/22/19: Danyell reports she is doing \"good.\"  No significant changes since last visit.  Her brother, Onesimo, moved to [place] and she reports she is managing change.  She does report difficulty staying asleep and is probably attributable to her taking Prozac and Abilify at bedtime.  Advised her to take in the morning.      Recent Symptoms:   Depression:  occasional low mood  Elevated:  none  Psychosis:  none  Anxiety:  occasional worry  Panic Attack:  none  Trauma Related:  none     Recent Substance Use:  Alcohol- no , Tobacco- no , Caffeine- coffee/ tea [1 cup], Opioids- no    Narcan Kit- N/A , Cannabis- no  and Other Illicit Drugs-none        Social/ Family History                                  [per patient report]                                 1ea,1ea   FINANCIAL SUPPORT- not working.  Has not worked since graduating from high school    CHILDREN- None       LIVING SITUATION- Lives with mother and stepfather in [place], [place]      LEGAL- None  EARLY HISTORY/ EDUCATION- Grew up in [place], [place].  Graduated from high school.  Received special education accommodations while in high school  SOCIAL/ SPIRITUAL SUPPORT- close friends and brother       TRAUMA HISTORY (self-report)- None  FEELS SAFE AT HOME- Yes  FAMILY HISTORY-  unknown    Medical / Surgical History                                                                       "                                            Patient Active Problem List   Diagnosis     Anxiety     Tardive dyskinesia     Major depressive disorder     Eczema     Insomnia     Psoriasis       Past Surgical History:   Procedure Laterality Date     C ORAL SURGERY PROCEDURE      wisdom teeth  - had a tooth pulled down        Medical Review of Systems                                                                                                    2,10   The remainder of the review of systems is noncontributory  Allergy                                Patient has no known allergies.  Current Medications                                                                                                       Current Outpatient Medications   Medication Sig Dispense Refill     amphetamine-dextroamphetamine (ADDERALL XR) 20 MG 24 hr capsule Take 2 capsules (40 mg) by mouth daily 60 capsule 0     amphetamine-dextroamphetamine (ADDERALL XR) 20 MG 24 hr capsule Take 2 capsules (40 mg) by mouth daily 60 capsule 0     amphetamine-dextroamphetamine (ADDERALL XR) 20 MG 24 hr capsule Take 2 capsules (40 mg) by mouth daily 60 capsule 0     desogestrel-ethinyl estradiol (JULEBER) 0.15-30 MG-MCG tablet Take 1 tablet by mouth daily 84 tablet 3     FLUoxetine (PROZAC) 10 MG capsule Take 1 capsule (10 mg) by mouth daily In addition to 20mg capsule for total daily dose of 30mg 60 capsule 0     FLUoxetine (PROZAC) 20 MG capsule Take 1 capsule (20 mg) by mouth daily In addition to 10mg capsule for total daily dose of 30mg 60 capsule 0     ibuprofen (ADVIL/MOTRIN) 600 MG tablet Take 600 mg by mouth every 6 hours as needed        PSYLLIUM PO Take 1 tsp. by mouth daily as needed        Vitals                                                                                                                       3, 3   There were no vitals taken for this visit.   Mental Status Exam                                                                                     9, 14 cog gs     Alertness: alert  and oriented  Appearance: casually groomed  Behavior/Demeanor: cooperative and pleasant, with fair  eye contact   Speech: regular rate and rhythm  Language: intact  Psychomotor: normal or unremarkable  Mood: description consistent with euthymia  Affect: appropriate; was congruent to mood; was congruent to content  Thought Process/Associations: unremarkable  Thought Content:  Reports none;  Denies suicidal ideation and violent ideation  Perception:  Reports none;  Denies auditory hallucinations and visual hallucinations  Insight: adequate  Judgment: good  Cognition: (6) does  appear grossly intact; formal cognitive testing was not done  Gait/Station and/or Muscle Strength/Tone: unable to assess    Labs and Data                                                                                                                 Rating Scales:    PHQ9    PHQ9 Today:  Not completed    PHQ-9 SCORE 3/22/2019 6/14/2019 8/30/2019   PHQ-9 Total Score 4 1 0         Diagnosis and Assessment                                                                             m2, h3     Today the following issues were addressed:    1) Major Depressive Disorder, recurrent, full remission  2) Autism Spectrum Disorder (Hx)     [place] Prescription Monitoring Program [] was checked today:  indicates Danyell has been regularly filling Adderall XR 20mg #60 for past 12 months.       Plan                                                                                                                    m2, h3      1) Medication Management  Continue Adderall XR 40mg daily  Continue Prozac 30mg daily       RTC: 3 months  CRISIS NUMBERS:   Provided routinely in AVS.    Treatment Risk Statement:  The patient understands the risks, benefits, adverse effects and alternatives. Agrees to treatment with the capacity to do so. No medical contraindications to treatment. Agrees to call clinic for any problems. The patient  understands to call 911 or go to the nearest ED if life threatening or urgent symptoms occur.      PROVIDER:  ZBIGNIEW Murphy CNP

## 2020-10-14 DIAGNOSIS — F32.5 MAJOR DEPRESSION IN COMPLETE REMISSION (H): ICD-10-CM

## 2020-10-14 RX ORDER — FLUOXETINE 10 MG/1
10 CAPSULE ORAL DAILY
Qty: 30 CAPSULE | Refills: 0 | Status: SHIPPED | OUTPATIENT
Start: 2020-10-14 | End: 2020-11-10

## 2020-10-14 NOTE — TELEPHONE ENCOUNTER
Medication requested:  FLUoxetine (PROZAC) 10 MG capsule  Take 1 capsule (10 mg) by mouth daily In addition to 20mg capsule for total daily dose of 30mg   Last refilled: 8/11/20  Qty: 60/0  FLUoxetine (PROZAC) 20 MG capsule  Take 1 capsule (20 mg) by mouth daily In addition to 10mg capsule for total daily dose of 30mg   Last refilled: 8/11/20  Qty: 60/0    Last seen: 8/26/20  RTC: 3 months  Cancel: 0  No-show: 0  Next appt: 0    Refill decision:   Refilled for 30 days per protocol.  Overridden by Viktor Ordaz APRN CNP on Aug 11, 2020 10:05 AM   Drug-Drug   1. IBUPROFEN / SELECTIVE SEROTONIN REUPTAKE INHIBITORS [Level: Major] [Reason: Tolerated medication/side effects in past]   Other Orders: ibuprofen (ADVIL/MOTRIN) 600 MG tablet

## 2020-11-10 ENCOUNTER — VIRTUAL VISIT (OUTPATIENT)
Dept: PSYCHIATRY | Facility: CLINIC | Age: 30
End: 2020-11-10
Attending: NURSE PRACTITIONER
Payer: COMMERCIAL

## 2020-11-10 DIAGNOSIS — F32.5 MAJOR DEPRESSION IN COMPLETE REMISSION (H): ICD-10-CM

## 2020-11-10 PROCEDURE — 99213 OFFICE O/P EST LOW 20 MIN: CPT | Mod: GT | Performed by: NURSE PRACTITIONER

## 2020-11-10 RX ORDER — DEXTROAMPHETAMINE SACCHARATE, AMPHETAMINE ASPARTATE MONOHYDRATE, DEXTROAMPHETAMINE SULFATE AND AMPHETAMINE SULFATE 5; 5; 5; 5 MG/1; MG/1; MG/1; MG/1
40 CAPSULE, EXTENDED RELEASE ORAL DAILY
Qty: 60 CAPSULE | Refills: 0 | Status: SHIPPED | OUTPATIENT
Start: 2021-01-07 | End: 2021-04-20

## 2020-11-10 RX ORDER — FLUOXETINE 10 MG/1
10 CAPSULE ORAL DAILY
Qty: 30 CAPSULE | Refills: 2 | Status: SHIPPED | OUTPATIENT
Start: 2020-11-10 | End: 2021-02-03

## 2020-11-10 RX ORDER — DEXTROAMPHETAMINE SACCHARATE, AMPHETAMINE ASPARTATE MONOHYDRATE, DEXTROAMPHETAMINE SULFATE AND AMPHETAMINE SULFATE 5; 5; 5; 5 MG/1; MG/1; MG/1; MG/1
40 CAPSULE, EXTENDED RELEASE ORAL DAILY
Qty: 60 CAPSULE | Refills: 0 | Status: SHIPPED | OUTPATIENT
Start: 2020-12-09 | End: 2021-04-20

## 2020-11-10 ASSESSMENT — PAIN SCALES - GENERAL: PAINLEVEL: NO PAIN (0)

## 2020-11-10 NOTE — PROGRESS NOTES
"Video- Visit Details  Type of service:  video visit for medication management  Time of service:    Date:  11/10/2020    Video Start Time:  1:07 PM        Video End Time:  1:17pm    Reason for video visit:  Patient unable to travel due to Covid-19  Originating Site (patient location):  [facility]   Location- Patient's home  Distant Site (provider location):  Remote location  Mode of Communication:  Video Conference via AmWell  Consent:  Patient has given verbal consent for video visit?: Yes     VIDEO VISIT  Danyell Orr is a 30 year old patient who is being evaluated via a billable video visit.      The patient has been notified of following:   \"This video visit will be conducted via a call between you and your physician/provider. We have found that certain health care needs can be provided without the need for an in-person physical exam. This service lets us provide the care you need with a video conversation. If a prescription is necessary we can send it directly to your pharmacy. If lab work is needed we can place an order for that and you can then stop by our lab to have the test done at a later time. Insurers are generally covering virtual visits as they would in-office visits so billing should not be different than normal.  If for some reason you do get billed incorrectly, you should contact the billing office to correct it and that number is in the AVS .    Video Conference to be completed via:  [email or web address]    Patient has given verbal consent for video visit?:  Yes    Patient would prefer that any video invitations be sent by: Text to cell phone: [phone]      How would patient like to obtain AVS?:  Heatmaps    AVS SmartPhrase [PsychAVS] has been placed in 'Patient Instructions':  Yes      Psychiatry Clinic Progress Note                                                                   Danyell Orr is a 30 year old female who returns to the clinic for follow-up care  Therapist: None  PCP: Raissa Garibay " "Franck  Other Providers: None    Pertinent Background:  See previous notes.  Psych critical item history includes trauma hx.     Interim History                                                                                                        4, 4     The patient is a good historian, reports good treatment adherence and was last seen 8/26/20.  Since the last visit, Danyell reports she is \"good.\" Mood is positive.  No concerns with anxiety. Niece born at end of October which has been beneficial to mood.  Sleep is not a concern.      8/26/20: Danyell reports she is \"doing alright.\"  Continues to socialize with her friends virtually.  Also reading and watching television.  Danyell reports she has lost approximately 30 lbs since stopping Abilify.  Sleep is \"ok.\"  She also shares her excitement about becoming an aunt again.  Danyell also will be moving in October.      6/3/20: Danyell reports the increase in Prozac was helpful as it decreased irritability, improved depression, and decreased anxiety.  No concerns with side effects.  No concerns with sleep.  Continues to socialize with friend fairly regularly.      4/3/20: Danyell reports she's doing as \"good as I can now.\" Stress and anxiety have increased due Covid 19 pandemic.  She misses socializing with her friends.  No concerns with uncontrollable blinking that was issue last fall.  Sleep is not a concern.  She is requesting an increase in antidepressant.      1/16/20: Danyell reports she is doing \"well.\"  Danyell does not endorse concern regarding excessively blinking which was an issue in October.  Stopped since Abilify discontinued.  Sleep is \"ok.\"  Continues to socialize with friends.  Saw neurologist on 1/10/19.  Overall, Danyell is doing really well.      11/22/19: Danyell reports blinking has stopped after Abilify was discontinued.  No blinking observed during appointment.  She is unsure how many days after discontinuation the symptoms subsided.  Danyell has not reported any worsening of " "mental health symptoms.  Affect bright and appropriately talkative.  Sleep \"ok\" but not a concern.  Adderall continues to be helpful.      8/30/19: Danyell reports she continues to do well.  Very bright affect and appropriately talkative.  No concerns with symptoms.  Continues to spend time socializing with friends.  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  Adderall continues to be effective with management of attention. No concerns with sleep, anxiety, and appetite suppression.        6/14/19: Danyell reports she is \"good!\"  Affect quite bright today and talkative.  Again reports no significant changes.  She talks with her brother frequently.  Danyell reports he sleep did improve after changing when she takes her Abilify and Prozac from bedtime to morning.      3/22/19: Danyell reports she is doing \"good.\"  No significant changes since last visit.  Her brother, Onesimo, moved to [place] and she reports she is managing change.  She does report difficulty staying asleep and is probably attributable to her taking Prozac and Abilify at bedtime.  Advised her to take in the morning.      Recent Symptoms:   Depression:  occasional low mood  Elevated:  none  Psychosis:  none  Anxiety:  occasional worry  Panic Attack:  none  Trauma Related:  none     Recent Substance Use:  Alcohol- no , Tobacco- no , Caffeine- coffee/ tea [1 cup], Opioids- no    Narcan Kit- N/A , Cannabis- no  and Other Illicit Drugs-none        Social/ Family History                                  [per patient report]                                 1ea,1ea   FINANCIAL SUPPORT- not working.  Has not worked since graduating from high school    CHILDREN- None       LIVING SITUATION- Lives with mother and stepfather in [place], [place]      LEGAL- None  EARLY HISTORY/ EDUCATION- Grew up in [place], [place].  Graduated from high school.  Received special education accommodations while in high school  SOCIAL/ SPIRITUAL SUPPORT- close friends and brother       TRAUMA " HISTORY (self-report)- None  FEELS SAFE AT HOME- Yes  FAMILY HISTORY-  unknown    Medical / Surgical History                                                                                                                  Patient Active Problem List   Diagnosis     Anxiety     Tardive dyskinesia     Major depressive disorder     Eczema     Insomnia     Psoriasis       Past Surgical History:   Procedure Laterality Date     C ORAL SURGERY PROCEDURE      wisdom teeth  - had a tooth pulled down        Medical Review of Systems                                                                                                    2,10   The remainder of the review of systems is noncontributory  Allergy                                Patient has no known allergies.  Current Medications                                                                                                       Current Outpatient Medications   Medication Sig Dispense Refill     amphetamine-dextroamphetamine (ADDERALL XR) 20 MG 24 hr capsule Take 2 capsules (40 mg) by mouth daily 60 capsule 0     amphetamine-dextroamphetamine (ADDERALL XR) 20 MG 24 hr capsule Take 2 capsules (40 mg) by mouth daily 60 capsule 0     amphetamine-dextroamphetamine (ADDERALL XR) 20 MG 24 hr capsule Take 2 capsules (40 mg) by mouth daily 60 capsule 0     amphetamine-dextroamphetamine (ADDERALL XR) 20 MG 24 hr capsule Take 2 capsules (40 mg) by mouth daily 60 capsule 0     amphetamine-dextroamphetamine (ADDERALL XR) 20 MG 24 hr capsule Take 2 capsules (40 mg) by mouth daily 60 capsule 0     desogestrel-ethinyl estradiol (JULEBER) 0.15-30 MG-MCG tablet Take 1 tablet by mouth daily 84 tablet 3     FLUoxetine (PROZAC) 10 MG capsule Take 1 capsule (10 mg) by mouth daily In addition to 20mg capsule for total daily dose of 30mg 30 capsule 0     FLUoxetine (PROZAC) 20 MG capsule Take 1 capsule (20 mg) by mouth daily In addition to 10mg capsule for total daily dose of 30mg 30 capsule  0     ibuprofen (ADVIL/MOTRIN) 600 MG tablet Take 600 mg by mouth every 6 hours as needed        PSYLLIUM PO Take 1 tsp. by mouth daily as needed        Vitals                                                                                                                       3, 3   There were no vitals taken for this visit.   Mental Status Exam                                                                                    9, 14 cog gs     Alertness: alert  and oriented  Appearance: casually groomed  Behavior/Demeanor: cooperative and pleasant, with good  eye contact   Speech: regular rate and rhythm  Language: no problems  Psychomotor: normal or unremarkable  Mood: description consistent with euthymia  Affect: appropriate; was congruent to mood; was congruent to content  Thought Process/Associations: unremarkable  Thought Content:  Reports none;  Denies suicidal ideation and violent ideation  Perception:  Reports none;  Denies auditory hallucinations and visual hallucinations  Insight: adequate  Judgment: good  Cognition: (6) does  appear grossly intact; formal cognitive testing was not done  Gait/Station and/or Muscle Strength/Tone: unable to assess    Labs and Data                                                                                                                 Rating Scales:    PHQ9    PHQ9 Today:  Not completed    PHQ-9 SCORE 3/22/2019 6/14/2019 8/30/2019   PHQ-9 Total Score 4 1 0         Diagnosis and Assessment                                                                             m2, h3     Today the following issues were addressed:    1) Major Depressive Disorder, recurrent, full remission  2) Autism Spectrum Disorder (Hx)     [place] Prescription Monitoring Program [] was checked today:  indicates Danyell has been regularly filling Adderall XR 20mg #60 for past 12 months.       Plan                                                                                                                     m2, h3      1) Medication Management  Continue Adderall XR 40mg daily  Continue Prozac 30mg daily       RTC: 3 months  CRISIS NUMBERS:   Provided routinely in AVS.    Treatment Risk Statement:  The patient understands the risks, benefits, adverse effects and alternatives. Agrees to treatment with the capacity to do so. No medical contraindications to treatment. Agrees to call clinic for any problems. The patient understands to call 911 or go to the nearest ED if life threatening or urgent symptoms occur.      PROVIDER:  ZBIGNIEW Murphy CNP

## 2020-11-10 NOTE — PATIENT INSTRUCTIONS
Thank you for coming to the [facility] [place] [facility].    Lab Testing:  If you had lab testing today and your results are reassuring or normal they will be mailed to you or sent through Balandras within 7 days. If the lab tests need quick action we will call you with the results. The phone number we will call with results is # [phone] (home) [phone] (work). If this is not the best number please call our clinic and change the number.    Medication Refills:  If you need any refills please call your pharmacy and they will contact us. Our fax number for refills is [phone]. Please allow three business for refill processing. If you need to  your refill at a new pharmacy, please contact the new pharmacy directly. The new pharmacy will help you get your medications transferred.     Scheduling:  If you have any concerns about today's visit or wish to schedule another appointment please call our office during normal business hours [phone] (8-5:00 M-F)    Contact Us:  Please call [phone] during business hours (8-5:00 M-F).  If after clinic hours, or on the weekend, please call  [phone].    Financial Assistance [phone]  [facility] Billing [phone]  Central Billing Office, [facility]: [phone]  [facility] Billing [phone]  Medical Records [phone]  [facility] Patient Bill of Rights [email or web address]       MENTAL HEALTH CRISIS NUMBERS:  For a medical emergency please call  911 or go to the nearest ER.     [facility] [place]:   [facility] -[phone]   Crisis Residence [facility]idence -[phone]   Walk-In Counseling Center [facility] -[phone]   COPE 24/7 [place] Mobile Team -[phone] (adults)/[phone] (child)  CHILD: [facility] needs assessment team - [phone]      [facility] - [phone]   Walk-in counseling  [facility] - [phone]   Walk-in counseling [facility] - [phone]   Crisis Residence [facility] - [phone]  Urgent Care Adult Mental [facility]br-[phone] mobile unit/ 24/7 crisis line    National Crisis Numbers:   National Suicide Prevention Lifeline: 8-543-416-TALK (122-167-8213)  Poison Control Center - 6-790-309-0864  [employer]/resources for a list of additional resources (SOS)  Trans Lifeline a hotline for transgender people 9-042-123-1967  [employer] a hotline for LGBT youth 1-658.681.2826  Crisis Text Line: For any crisis 24/7   To: 384423  see www.crisistextline.org  - IF MAKING A CALL FEELS TOO HARD, send a text!         Again thank you for choosing [facility] [place] [facility] and please let us know how we can best partner with you to improve you and your family's health.    You may be receiving a survey regarding this appointment. We would love to have your feedback, both positive and negative. The survey is done by an external company, so your answers are anonymous.

## 2020-11-16 ENCOUNTER — HEALTH MAINTENANCE LETTER (OUTPATIENT)
Age: 30
End: 2020-11-16

## 2021-02-03 ENCOUNTER — VIRTUAL VISIT (OUTPATIENT)
Dept: PSYCHIATRY | Facility: CLINIC | Age: 31
End: 2021-02-03
Attending: NURSE PRACTITIONER
Payer: COMMERCIAL

## 2021-02-03 DIAGNOSIS — F84.0 ACTIVE AUTISTIC DISORDER: Primary | ICD-10-CM

## 2021-02-03 DIAGNOSIS — F32.5 MAJOR DEPRESSION IN COMPLETE REMISSION (H): ICD-10-CM

## 2021-02-03 PROCEDURE — 99213 OFFICE O/P EST LOW 20 MIN: CPT | Mod: GT | Performed by: NURSE PRACTITIONER

## 2021-02-03 RX ORDER — FLUOXETINE 10 MG/1
10 CAPSULE ORAL DAILY
Qty: 30 CAPSULE | Refills: 2 | Status: SHIPPED | OUTPATIENT
Start: 2021-02-03 | End: 2021-04-20

## 2021-02-03 RX ORDER — DEXTROAMPHETAMINE SACCHARATE, AMPHETAMINE ASPARTATE MONOHYDRATE, DEXTROAMPHETAMINE SULFATE AND AMPHETAMINE SULFATE 5; 5; 5; 5 MG/1; MG/1; MG/1; MG/1
40 CAPSULE, EXTENDED RELEASE ORAL DAILY
Qty: 60 CAPSULE | Refills: 0 | Status: SHIPPED | OUTPATIENT
Start: 2021-03-06 | End: 2021-04-05

## 2021-02-03 RX ORDER — DEXTROAMPHETAMINE SACCHARATE, AMPHETAMINE ASPARTATE MONOHYDRATE, DEXTROAMPHETAMINE SULFATE AND AMPHETAMINE SULFATE 5; 5; 5; 5 MG/1; MG/1; MG/1; MG/1
40 CAPSULE, EXTENDED RELEASE ORAL DAILY
Qty: 60 CAPSULE | Refills: 0 | Status: SHIPPED | OUTPATIENT
Start: 2021-02-03 | End: 2021-03-05

## 2021-02-03 RX ORDER — DEXTROAMPHETAMINE SACCHARATE, AMPHETAMINE ASPARTATE MONOHYDRATE, DEXTROAMPHETAMINE SULFATE AND AMPHETAMINE SULFATE 5; 5; 5; 5 MG/1; MG/1; MG/1; MG/1
40 CAPSULE, EXTENDED RELEASE ORAL DAILY
Qty: 60 CAPSULE | Refills: 0 | Status: SHIPPED | OUTPATIENT
Start: 2021-04-06 | End: 2021-04-20

## 2021-02-03 ASSESSMENT — PAIN SCALES - GENERAL: PAINLEVEL: NO PAIN (0)

## 2021-02-03 NOTE — PATIENT INSTRUCTIONS
**For crisis resources, please see the information at the end of this document**     Patient Education      Thank you for coming to the [facility] [place] [facility].    Lab Testing:  If you had lab testing today and your results are reassuring or normal they will be mailed to you or sent through XVionics within 7 days. If the lab tests need quick action we will call you with the results. The phone number we will call with results is # [phone] (home) [phone] (work). If this is not the best number please call our clinic and change the number.    Medication Refills:  If you need any refills please call your pharmacy and they will contact us. Our fax number for refills is [phone]. Please allow three business for refill processing. If you need to  your refill at a new pharmacy, please contact the new pharmacy directly. The new pharmacy will help you get your medications transferred.     Scheduling:  If you have any concerns about today's visit or wish to schedule another appointment please call our office during normal business hours [phone] (8-5:00 M-F)    Contact Us:  Please call [phone] during business hours (8-5:00 M-F).  If after clinic hours, or on the weekend, please call  [phone].    Financial Assistance [phone]  [facility] Billing [phone]  Central Billing Office, [facility]: [phone]  [facility] Billing [phone]  Medical Records [phone]  [facility] Patient Bill of Rights [email or web address]       MENTAL HEALTH CRISIS NUMBERS:  For a medical emergency please call  911 or go to the nearest ER.     [facility]:   [facility] -[phone]   Crisis Residence [facility] -[phone]   Walk-In Counseling Center hospitals -[phone]   COPE 24/7 [facility] Mobile Team -[phone] (adults)/[phone] (child)  CHILD: [facility]  needs assessment team - [phone]      [facility]:   [facility] - [phone]   Walk-in counseling [facility] - [phone]   Walk-in counseling [facility] - [phone]   Crisis Residence [facility] Residence - [phone]  Urgent Care Adult Mental Niv[facility]n-[phone] mobile unit/ 24/7 crisis line    National Crisis Numbers:   National Suicide Prevention Lifeline: 1-652-385-TALK (536-727-0279)  Poison Control Center - 7-175-900-5333  [employer]/resources for a list of additional resources (SOS)  Trans Lifeline a hotline for transgender people 8-190-065-5780  The [employer] a hotline for LGBT youth 4-213-749-9409  Crisis Text Line: For any crisis 24/7   To: 688084  see www.crisistextline.org  - IF MAKING A CALL FEELS TOO HARD, send a text!         Again thank you for choosing [facility] MENTAL HEALTH & ADDICTION [facility] and please let us know how we can best partner with you to improve you and your family's health.    You may be receiving a survey regarding this appointment. We would love to have your feedback, both positive and negative. The survey is done by an external company, so your answers are anonymous.

## 2021-02-03 NOTE — PROGRESS NOTES
"Video- Visit Details  Type of service:  video visit for medication management  Time of service:    Date:  02/03/2021    Video Start Time:  3:15 PM   Late start due tech issues  Video End Time:  3:25pm    Reason for video visit:  Patient unable to travel due to Covid-19  Originating Site (patient location):  [facility]   Location- Friend or family home  Distant Site (provider location):  Remote location  Mode of Communication:  Video Conference via [email or web address]  Consent:  Patient has given verbal consent for video visit?: Yes     VIDEO VISIT  Danyell Orr is a 30 year old patient who is being evaluated via a billable video visit.      The patient has been notified of following:   \"This video visit will be conducted via a call between you and your physician/provider. We have found that certain health care needs can be provided without the need for an in-person physical exam. This service lets us provide the care you need with a video conversation. If a prescription is necessary we can send it directly to your pharmacy. If lab work is needed we can place an order for that and you can then stop by our lab to have the test done at a later time. Insurers are generally covering virtual visits as they would in-office visits so billing should not be different than normal.  If for some reason you do get billed incorrectly, you should contact the billing office to correct it and that number is in the AVS .    Video Conference to be completed via:  [email or web address]    Patient has given verbal consent for video visit?:  Yes    Patient would prefer that any video invitations be sent by: Send to e-mail at: [email or web address]      How would patient like to obtain AVS?:  RacemimirthaVyclone    AVS SmartPhrase [PsychAVS] has been placed in 'Patient Instructions':  Yes       Psychiatry Clinic Progress Note                                                                   Danyell Orr is a 30 year old female who returns to the clinic for follow-up " "care  Therapist: None  PCP: Raissa Garibay  Other Providers: None    Pertinent Background:  See previous notes.  Psych critical item history includes trauma hx.     Interim History                                                                                                        4, 4     The patient is a good historian, reports good treatment adherence and was last seen 11/10/20.  Since the last visit, Danyell again reports she is \"good.\"  No remarkable concerns with depression or anxiety.  Currently staying at her brother's home in [place].  She is helping her brother and sister-in-law take care of .  Unsure how long she'll be staying.      11/10/20: Danyell reports she is \"good.\" Mood is positive.  No concerns with anxiety. Niece born at end of October which has been beneficial to mood.  Sleep is not a concern.      20: Danyell reports she is \"doing alright.\"  Continues to socialize with her friends virtually.  Also reading and watching television.  Danyell reports she has lost approximately 30 lbs since stopping Abilify.  Sleep is \"ok.\"  She also shares her excitement about becoming an aunt again.  Danyell also will be moving in October.      6/3/20: Danyell reports the increase in Prozac was helpful as it decreased irritability, improved depression, and decreased anxiety.  No concerns with side effects.  No concerns with sleep.  Continues to socialize with friend fairly regularly.      4/3/20: Danyell reports she's doing as \"good as I can now.\" Stress and anxiety have increased due Covid 19 pandemic.  She misses socializing with her friends.  No concerns with uncontrollable blinking that was issue last .  Sleep is not a concern.  She is requesting an increase in antidepressant.      20: Danyell reports she is doing \"well.\"  Danyell does not endorse concern regarding excessively blinking which was an issue in October.  Stopped since Abilify discontinued.  Sleep is \"ok.\"  Continues to socialize with friends.  Saw " "neurologist on 1/10/19.  Overall, Danyell is doing really well.      11/22/19: Danyell reports blinking has stopped after Abilify was discontinued.  No blinking observed during appointment.  She is unsure how many days after discontinuation the symptoms subsided.  Danyell has not reported any worsening of mental health symptoms.  Affect bright and appropriately talkative.  Sleep \"ok\" but not a concern.  Adderall continues to be helpful.      8/30/19: Danyell reports she continues to do well.  Very bright affect and appropriately talkative.  No concerns with symptoms.  Continues to spend time socializing with friends.  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  Adderall continues to be effective with management of attention. No concerns with sleep, anxiety, and appetite suppression.        6/14/19: Danyell reports she is \"good!\"  Affect quite bright today and talkative.  Again reports no significant changes.  She talks with her brother frequently.  Danyell reports he sleep did improve after changing when she takes her Abilify and Prozac from bedtime to morning.      3/22/19: Danyell reports she is doing \"good.\"  No significant changes since last visit.  Her brother, Onesimo, moved to [place] and she reports she is managing change.  She does report difficulty staying asleep and is probably attributable to her taking Prozac and Abilify at bedtime.  Advised her to take in the morning.      Recent Symptoms:   Depression:  occasional low mood  Elevated:  none  Psychosis:  none  Anxiety:  occasional worry  Panic Attack:  none  Trauma Related:  none     Recent Substance Use:  Alcohol- no , Tobacco- no , Caffeine- coffee/ tea [1 cup], Opioids- no    Narcan Kit- N/A , Cannabis- no  and Other Illicit Drugs-none        Social/ Family History                                  [per patient report]                                 1ea,1ea   FINANCIAL SUPPORT- not working.  Has not worked since graduating from high school    CHILDREN- " None       LIVING SITUATION- Lives with mother and stepfather in [place], [place]      LEGAL- None  EARLY HISTORY/ EDUCATION- Grew up in [place], [place].  Graduated from high school.  Received special education accommodations while in high school  SOCIAL/ SPIRITUAL SUPPORT- close friends and brother       TRAUMA HISTORY (self-report)- None  FEELS SAFE AT HOME- Yes  FAMILY HISTORY-  unknown    Medical / Surgical History                                                                                                                  Patient Active Problem List   Diagnosis     Anxiety     Tardive dyskinesia     Major depressive disorder     Eczema     Insomnia     Psoriasis       Past Surgical History:   Procedure Laterality Date     C ORAL SURGERY PROCEDURE      wisdom teeth  - had a tooth pulled down        Medical Review of Systems                                                                                                    2,10   The remainder of the review of systems is noncontributory  Allergy                                Patient has no known allergies.  Current Medications                                                                                                       Current Outpatient Medications   Medication Sig Dispense Refill     amphetamine-dextroamphetamine (ADDERALL XR) 20 MG 24 hr capsule Take 2 capsules (40 mg) by mouth daily 60 capsule 0     desogestrel-ethinyl estradiol (JULEBER) 0.15-30 MG-MCG tablet Take 1 tablet by mouth daily 84 tablet 3     FLUoxetine (PROZAC) 10 MG capsule Take 1 capsule (10 mg) by mouth daily In addition to 20mg capsule for total daily dose of 30mg 30 capsule 2     FLUoxetine (PROZAC) 20 MG capsule Take 1 capsule (20 mg) by mouth daily In addition to 10mg capsule for total daily dose of 30mg 30 capsule 2     ibuprofen (ADVIL/MOTRIN) 600 MG tablet Take 600 mg by mouth every 6 hours as needed        PSYLLIUM PO Take 1 tsp. by mouth daily as needed         "amphetamine-dextroamphetamine (ADDERALL XR) 20 MG 24 hr capsule Take 2 capsules (40 mg) by mouth daily (Patient not taking: Reported on 2/3/2021) 60 capsule 0     amphetamine-dextroamphetamine (ADDERALL XR) 20 MG 24 hr capsule Take 2 capsules (40 mg) by mouth daily (Patient not taking: Reported on 2/3/2021) 60 capsule 0     amphetamine-dextroamphetamine (ADDERALL XR) 20 MG 24 hr capsule Take 2 capsules (40 mg) by mouth daily (Patient not taking: Reported on 2/3/2021) 60 capsule 0     Vitals                                                                                                                       3, 3   There were no vitals taken for this visit.   Mental Status Exam                                                                                    9, 14 cog gs     Alertness: alert  and oriented  Appearance: casually groomed  Behavior/Demeanor: cooperative and pleasant, with good  eye contact   Speech: normal  Language: no problems  Psychomotor: normal or unremarkable  Mood: \"good\"  Affect: appropriate; was congruent to mood; was congruent to content  Thought Process/Associations: unremarkable  Thought Content:  Reports none;  Denies suicidal ideation and violent ideation  Perception:  Reports none;  Denies auditory hallucinations and visual hallucinations  Insight: adequate  Judgment: good  Cognition: (6) does  appear grossly intact; formal cognitive testing was not done  Gait/Station and/or Muscle Strength/Tone: unable to assess    Labs and Data                                                                                                                 Rating Scales:    PHQ9    PHQ9 Today:  Not completed    PHQ-9 SCORE 3/22/2019 6/14/2019 8/30/2019   PHQ-9 Total Score 4 1 0         Diagnosis and Assessment                                                                             m2, h3     Today the following issues were addressed:    1) Major Depressive Disorder, recurrent, full remission  2) Autism " Spectrum Disorder (Hx)     [place] Prescription Monitoring Program [] was checked today:  indicates Danyell has been regularly filling Adderall XR 20mg #60 for past 12 months.       Plan                                                                                                                    m2, h3      1) Medication Management  Continue Adderall XR 40mg daily  Continue Prozac 30mg daily       RTC: 3 months  CRISIS NUMBERS:   Provided routinely in AVS.    Treatment Risk Statement:  The patient understands the risks, benefits, adverse effects and alternatives. Agrees to treatment with the capacity to do so. No medical contraindications to treatment. Agrees to call clinic for any problems. The patient understands to call 911 or go to the nearest ED if life threatening or urgent symptoms occur.      PROVIDER:  ZBIGNIEW Murphy CNP

## 2021-04-03 ENCOUNTER — HEALTH MAINTENANCE LETTER (OUTPATIENT)
Age: 31
End: 2021-04-03

## 2021-04-20 ENCOUNTER — VIRTUAL VISIT (OUTPATIENT)
Dept: PSYCHIATRY | Facility: CLINIC | Age: 31
End: 2021-04-20
Attending: NURSE PRACTITIONER
Payer: COMMERCIAL

## 2021-04-20 ENCOUNTER — TELEPHONE (OUTPATIENT)
Dept: PSYCHIATRY | Facility: CLINIC | Age: 31
End: 2021-04-20

## 2021-04-20 DIAGNOSIS — F32.5 MAJOR DEPRESSION IN COMPLETE REMISSION (H): ICD-10-CM

## 2021-04-20 DIAGNOSIS — F84.0 ACTIVE AUTISTIC DISORDER: ICD-10-CM

## 2021-04-20 PROCEDURE — 99213 OFFICE O/P EST LOW 20 MIN: CPT | Mod: GT | Performed by: NURSE PRACTITIONER

## 2021-04-20 RX ORDER — DEXTROAMPHETAMINE SACCHARATE, AMPHETAMINE ASPARTATE MONOHYDRATE, DEXTROAMPHETAMINE SULFATE AND AMPHETAMINE SULFATE 5; 5; 5; 5 MG/1; MG/1; MG/1; MG/1
20 CAPSULE, EXTENDED RELEASE ORAL DAILY
Qty: 60 CAPSULE | Refills: 0 | COMMUNITY
Start: 2021-04-20 | End: 2021-06-30

## 2021-04-20 RX ORDER — FLUOXETINE 10 MG/1
10 CAPSULE ORAL DAILY
Qty: 30 CAPSULE | Refills: 2 | Status: SHIPPED | OUTPATIENT
Start: 2021-04-20 | End: 2021-05-11

## 2021-04-20 ASSESSMENT — PAIN SCALES - GENERAL: PAINLEVEL: NO PAIN (0)

## 2021-04-20 NOTE — PATIENT INSTRUCTIONS
**For crisis resources, please see the information at the end of this document**     Patient Education      Thank you for coming to the [facility] M[facility]AL HEALTH & ADDI[facility] [place] [facility].    Lab Testing:  If you had lab testing today and your results are reassuring or normal they will be mailed to you or sent through Sensorist within 7 days. If the lab tests need quick action we will call you with the results. The phone number we will call with results is # [phone] (home) [phone] (work). If this is not the best number please call our clinic and change the number.    Medication Refills:  If you need any refills please call your pharmacy and they will contact us. Our fax number for refills is [phone]. Please allow three business for refill processing. If you need to  your refill at a new pharmacy, please contact the new pharmacy directly. The new pharmacy will help you get your medications transferred.     Scheduling:  If you have any concerns about today's visit or wish to schedule another appointment please call our office during normal business hours [phone] (8-5:00 M-F)    Contact Us:  Please call [phone] during business hours (8-5:00 M-F).  If after clinic hours, or on the weekend, please call  [phone].    Financial Assistance [phone]  [facility] Billing [phone]  Central Billing Office, [facility]: [phone]  [facility] Billing [phone]  Medical Records [phone]  [facility] Patient Bill of Rights [email or web address]       MENTAL HEALTH CRISIS NUMBERS:  For a medical emergency please call  911 or go to the nearest ER.     [facility]:   [facility] -[phone]   Crisis Residence [facility] -[phone]   Walk-In Counseling Center \[facility] [place] [facility]\"" -[phone]   COPE 24/7 [place] Mobile Team -[phone] (adults)/[phone] (child)  CHILD: [facility]  needs assessment team - [phone]      [facility]:   [facility] - [phone]   Walk-in counseling [facility] - [phone]   Walk-in counseling [facility] - [phone]   Crisis Residence [facility] Residence - [phone]  Urgent Care Adult Mental Ntjlcv-[phone] mobile unit/ 24/7 crisis line    National Crisis Numbers:   National Suicide Prevention Lifeline: 4-641-268-TALK (334-779-7756)  Poison Control Center - 4-143-190-7958  [employer]/resources for a list of additional resources (SOS)  Trans Lifeline a hotline for transgender people 9-661-386-3523  [employer] a hotline for LGBT youth 9-724-460-7256  Crisis Text Line: For any crisis 24/7   To: 744650  see www.crisistextline.org  - IF MAKING A CALL FEELS TOO HARD, send a text!         Again thank you for choosing [facility] MENTAL HEALTH & ADDICTION [facility] and please let us know how we can best partner with you to improve you and your family's health.    You may be receiving a survey regarding this appointment. We would love to have your feedback, both positive and negative. The survey is done by an external company, so your answers are anonymous.

## 2021-04-20 NOTE — PROGRESS NOTES
"Video- Visit Details  Type of service:  video visit for medication management  Time of service:    Date:  04/20/2021    Video Start Time:  11:08 AM        Video End Time:  11:23am    Reason for video visit:  Patient unable to travel due to Covid-19  Originating Site (patient location):  [facility]   Location- Patient's home  Distant Site (provider location):  Remote location  Mode of Communication:  Video Conference via AmWell  Consent:  Patient has given verbal consent for video visit?: Yes     VIDEO VISIT  Danyell Orr is a 30 year old patient who is being evaluated via a billable video visit.      The patient has been notified of following:   \"This video visit will be conducted via a call between you and your physician/provider. We have found that certain health care needs can be provided without the need for an in-person physical exam. This service lets us provide the care you need with a video conversation. If a prescription is necessary we can send it directly to your pharmacy. If lab work is needed we can place an order for that and you can then stop by our lab to have the test done at a later time. Insurers are generally covering virtual visits as they would in-office visits so billing should not be different than normal.  If for some reason you do get billed incorrectly, you should contact the billing office to correct it and that number is in the AVS .    Video Conference to be completed via:  Amwell    Patient has given verbal consent for video visit?:  Yes    Patient would prefer that any video invitations be sent by: Send to e-mail at: [email or web address]      How would patient like to obtain AVS?:  Carbon Salonhart    AVS SmartPhrase [PsychAVS] has been placed in 'Patient Instructions':  Yes     Psychiatry Clinic Progress Note                                                                   Danyell Orr is a 30 year old female who returns to the clinic for follow-up care  Therapist: None  PCP: Lani, " "Raissa Juárez  Other Providers: None    Pertinent Background:  See previous notes.  Psych critical item history includes trauma hx.     Interim History                                                                                                        4, 4     The patient is a good historian, reports good treatment adherence and was last seen 2/3/21.  Since the last visit, Danyell reports today that she stopped taking Adderall daily approximately 1 year ago.  She has been taking intermittently.  She reports she stopped taking due it making her \"fat\" and concern about cardiac health.  She restarted Adderall XR 20mg due to issues with lack of focus.  Advised her to schedule appointment with PCP to discuss concerns about cardiac health. She does not endorse any symptoms associated with hypertension.  She does not have any recent vital readings but past readings have not been of remarkable concern.      2/3/21: Danyell again reports she is \"good.\"  No remarkable concerns with depression or anxiety.  Currently staying at her brother's home in [place].  She is helping her brother and sister-in-law take care of .  Unsure how long she'll be staying.      11/10/20: Danyell reports she is \"good.\" Mood is positive.  No concerns with anxiety. Niece born at end of October which has been beneficial to mood.  Sleep is not a concern.      20: Danyell reports she is \"doing alright.\"  Continues to socialize with her friends virtually.  Also reading and watching television.  Danyell reports she has lost approximately 30 lbs since stopping Abilify.  Sleep is \"ok.\"  She also shares her excitement about becoming an aunt again.  Danyell also will be moving in October.      6/3/20: Danyell reports the increase in Prozac was helpful as it decreased irritability, improved depression, and decreased anxiety.  No concerns with side effects.  No concerns with sleep.  Continues to socialize with friend fairly regularly.      4/3/20: Danyell reports she's " "doing as \"good as I can now.\" Stress and anxiety have increased due Covid 19 pandemic.  She misses socializing with her friends.  No concerns with uncontrollable blinking that was issue last fall.  Sleep is not a concern.  She is requesting an increase in antidepressant.      1/16/20: Danyell reports she is doing \"well.\"  Danyell does not endorse concern regarding excessively blinking which was an issue in October.  Stopped since Abilify discontinued.  Sleep is \"ok.\"  Continues to socialize with friends.  Saw neurologist on 1/10/19.  Overall, Danyell is doing really well.      11/22/19: Danyell reports blinking has stopped after Abilify was discontinued.  No blinking observed during appointment.  She is unsure how many days after discontinuation the symptoms subsided.  Danyell has not reported any worsening of mental health symptoms.  Affect bright and appropriately talkative.  Sleep \"ok\" but not a concern.  Adderall continues to be helpful.      8/30/19: Danyell reports she continues to do well.  Very bright affect and appropriately talkative.  No concerns with symptoms.  Continues to spend time socializing with friends.  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  Adderall continues to be effective with management of attention. No concerns with sleep, anxiety, and appetite suppression.        6/14/19: Danyell reports she is \"good!\"  Affect quite bright today and talkative.  Again reports no significant changes.  She talks with her brother frequently.  Danyell reports he sleep did improve after changing when she takes her Abilify and Prozac from bedtime to morning.      3/22/19: Danyell reports she is doing \"good.\"  No significant changes since last visit.  Her brother, Onesimo, moved to [place] and she reports she is managing change.  She does report difficulty staying asleep and is probably attributable to her taking Prozac and Abilify at bedtime.  Advised her to take in the morning.      Recent Symptoms:   Depression:  " occasional low mood  Elevated:  none  Psychosis:  none  Anxiety:  occasional worry and irritability  Panic Attack:  none  Trauma Related:  none     Recent Substance Use:  Alcohol- no , Tobacco- no , Caffeine- coffee/ tea [1 cup], Opioids- no    Narcan Kit- N/A , Cannabis- no  and Other Illicit Drugs-none        Social/ Family History                                  [per patient report]                                 1ea,1ea   FINANCIAL SUPPORT- not working.  Has not worked since graduating from high school    CHILDREN- None       LIVING SITUATION- Lives with mother and stepfather in [place], [place]      LEGAL- None  EARLY HISTORY/ EDUCATION- Grew up in [place], [place].  Graduated from high school.  Received special education accommodations while in high school  SOCIAL/ SPIRITUAL SUPPORT- close friends and brother       TRAUMA HISTORY (self-report)- None  FEELS SAFE AT HOME- Yes  FAMILY HISTORY-  unknown    Medical / Surgical History                                                                                                                  Patient Active Problem List   Diagnosis     Anxiety     Tardive dyskinesia     Major depressive disorder     Eczema     Insomnia     Psoriasis       Past Surgical History:   Procedure Laterality Date     C ORAL SURGERY PROCEDURE      wisdom teeth  - had a tooth pulled down        Medical Review of Systems                                                                                                    2,10   The remainder of the review of systems is noncontributory  Allergy                                Patient has no known allergies.  Current Medications                                                                                                       Current Outpatient Medications   Medication Sig Dispense Refill     amphetamine-dextroamphetamine (ADDERALL XR) 20 MG 24 hr capsule Take 2 capsules (40 mg) by mouth daily 60 capsule 0     amphetamine-dextroamphetamine (ADDERALL XR)  "20 MG 24 hr capsule Take 2 capsules (40 mg) by mouth daily 60 capsule 0     desogestrel-ethinyl estradiol (JULEBER) 0.15-30 MG-MCG tablet Take 1 tablet by mouth daily 84 tablet 3     FLUoxetine (PROZAC) 10 MG capsule Take 1 capsule (10 mg) by mouth daily In addition to 20mg capsule for total daily dose of 30mg 30 capsule 2     FLUoxetine (PROZAC) 20 MG capsule Take 1 capsule (20 mg) by mouth daily In addition to 10mg capsule for total daily dose of 30mg 30 capsule 2     ibuprofen (ADVIL/MOTRIN) 600 MG tablet Take 600 mg by mouth every 6 hours as needed        PSYLLIUM PO Take 1 tsp. by mouth daily as needed        amphetamine-dextroamphetamine (ADDERALL XR) 20 MG 24 hr capsule Take 2 capsules (40 mg) by mouth daily (Patient not taking: Reported on 2/3/2021) 60 capsule 0     amphetamine-dextroamphetamine (ADDERALL XR) 20 MG 24 hr capsule Take 2 capsules (40 mg) by mouth daily (Patient not taking: Reported on 2/3/2021) 60 capsule 0     amphetamine-dextroamphetamine (ADDERALL XR) 20 MG 24 hr capsule Take 2 capsules (40 mg) by mouth daily (Patient not taking: Reported on 2/3/2021) 60 capsule 0     Vitals                                                                                                                       3, 3   There were no vitals taken for this visit.   Mental Status Exam                                                                                    9, 14 cog gs     Alertness: alert  and oriented  Appearance: casually groomed  Behavior/Demeanor: cooperative, pleasant and calm, with good  eye contact   Speech: normal  Language: no problems  Psychomotor: normal or unremarkable  Mood: \"ok\"  Affect: appropriate; was congruent to mood; was congruent to content  Thought Process/Associations: unremarkable  Thought Content:  Reports none;  Denies suicidal ideation and violent ideation  Perception:  Reports none;  Denies auditory hallucinations and visual hallucinations  Insight: adequate  Judgment: " good  Cognition: (6) does  appear grossly intact; formal cognitive testing was not done  Gait/Station and/or Muscle Strength/Tone: unable to assess    Labs and Data                                                                                                                 Rating Scales:    PHQ9    PHQ9 Today:  Not completed    PHQ-9 SCORE 3/22/2019 6/14/2019 8/30/2019   PHQ-9 Total Score 4 1 0         Diagnosis and Assessment                                                                             m2, h3     Today the following issues were addressed:    1) Major Depressive Disorder, recurrent, full remission  2) Autism Spectrum Disorder (Hx)     [place] Prescription Monitoring Program [] was checked today:  indicates Danyell has been regularly filling Adderall XR 20mg #60 for past 12 months.       Plan                                                                                                                    m2, h3      1) Medication Management  Restart Adderall XR 20mg daily  Continue Prozac 30mg daily    RTC: 1 month    CRISIS NUMBERS:   Provided routinely in AVS.    Treatment Risk Statement:  The patient understands the risks, benefits, adverse effects and alternatives. Agrees to treatment with the capacity to do so. No medical contraindications to treatment. Agrees to call clinic for any problems. The patient understands to call 911 or go to the nearest ED if life threatening or urgent symptoms occur.      PROVIDER:  ZBIGNIEW Murphy CNP

## 2021-04-20 NOTE — TELEPHONE ENCOUNTER
On April 20, 2021, at 9:22 AM, writer called patient at [phone] and [phone] to confirm Virtual Visit. Writer unable to make contact with patient. Writer left detailed voice message for call back. [phone] left as call back number. Delisa Benito, CMA

## 2021-05-11 ENCOUNTER — OFFICE VISIT (OUTPATIENT)
Dept: FAMILY MEDICINE | Facility: CLINIC | Age: 31
End: 2021-05-11
Payer: COMMERCIAL

## 2021-05-11 VITALS
DIASTOLIC BLOOD PRESSURE: 80 MMHG | BODY MASS INDEX: 25.3 KG/M2 | HEIGHT: 61 IN | TEMPERATURE: 97.3 F | RESPIRATION RATE: 18 BRPM | WEIGHT: 134 LBS | SYSTOLIC BLOOD PRESSURE: 128 MMHG | HEART RATE: 76 BPM

## 2021-05-11 DIAGNOSIS — Z00.00 ROUTINE GENERAL MEDICAL EXAMINATION AT A HEALTH CARE FACILITY: Primary | ICD-10-CM

## 2021-05-11 DIAGNOSIS — Z11.3 SCREEN FOR STD (SEXUALLY TRANSMITTED DISEASE): ICD-10-CM

## 2021-05-11 DIAGNOSIS — F32.5 MAJOR DEPRESSION IN COMPLETE REMISSION (H): ICD-10-CM

## 2021-05-11 LAB
ANION GAP SERPL CALCULATED.3IONS-SCNC: 5 MMOL/L (ref 3–14)
BUN SERPL-MCNC: 11 MG/DL (ref 7–30)
CALCIUM SERPL-MCNC: 8.3 MG/DL (ref 8.5–10.1)
CHLORIDE SERPL-SCNC: 103 MMOL/L (ref 94–109)
CO2 SERPL-SCNC: 27 MMOL/L (ref 20–32)
CREAT SERPL-MCNC: 0.59 MG/DL (ref 0.52–1.04)
ERYTHROCYTE [DISTWIDTH] IN BLOOD BY AUTOMATED COUNT: 12.3 % (ref 10–15)
GFR SERPL CREATININE-BSD FRML MDRD: >90 ML/MIN/{1.73_M2}
GLUCOSE SERPL-MCNC: 83 MG/DL (ref 70–99)
HCT VFR BLD AUTO: 41.9 % (ref 35–47)
HGB BLD-MCNC: 13.8 G/DL (ref 11.7–15.7)
MCH RBC QN AUTO: 30.5 PG (ref 26.5–33)
MCHC RBC AUTO-ENTMCNC: 32.9 G/DL (ref 31.5–36.5)
MCV RBC AUTO: 93 FL (ref 78–100)
PLATELET # BLD AUTO: 266 10E9/L (ref 150–450)
POTASSIUM SERPL-SCNC: 3.8 MMOL/L (ref 3.4–5.3)
RBC # BLD AUTO: 4.52 10E12/L (ref 3.8–5.2)
SODIUM SERPL-SCNC: 135 MMOL/L (ref 133–144)
TSH SERPL DL<=0.005 MIU/L-ACNC: 0.95 MU/L (ref 0.4–4)
WBC # BLD AUTO: 9 10E9/L (ref 4–11)

## 2021-05-11 PROCEDURE — 99395 PREV VISIT EST AGE 18-39: CPT | Performed by: NURSE PRACTITIONER

## 2021-05-11 PROCEDURE — G0145 SCR C/V CYTO,THINLAYER,RESCR: HCPCS | Performed by: NURSE PRACTITIONER

## 2021-05-11 PROCEDURE — 85027 COMPLETE CBC AUTOMATED: CPT | Performed by: NURSE PRACTITIONER

## 2021-05-11 PROCEDURE — 84443 ASSAY THYROID STIM HORMONE: CPT | Performed by: NURSE PRACTITIONER

## 2021-05-11 PROCEDURE — 36415 COLL VENOUS BLD VENIPUNCTURE: CPT | Performed by: NURSE PRACTITIONER

## 2021-05-11 PROCEDURE — 80048 BASIC METABOLIC PNL TOTAL CA: CPT | Performed by: NURSE PRACTITIONER

## 2021-05-11 PROCEDURE — 99213 OFFICE O/P EST LOW 20 MIN: CPT | Mod: 25 | Performed by: NURSE PRACTITIONER

## 2021-05-11 PROCEDURE — 87591 N.GONORRHOEAE DNA AMP PROB: CPT | Performed by: NURSE PRACTITIONER

## 2021-05-11 PROCEDURE — 87491 CHLMYD TRACH DNA AMP PROBE: CPT | Performed by: NURSE PRACTITIONER

## 2021-05-11 RX ORDER — FLUOXETINE 10 MG/1
10 CAPSULE ORAL DAILY
Qty: 90 CAPSULE | Refills: 3 | Status: SHIPPED | OUTPATIENT
Start: 2021-05-11 | End: 2021-06-30

## 2021-05-11 ASSESSMENT — ENCOUNTER SYMPTOMS
PALPITATIONS: 1
FREQUENCY: 1
COUGH: 1
SHORTNESS OF BREATH: 1
HEADACHES: 1
NERVOUS/ANXIOUS: 1
ARTHRALGIAS: 1

## 2021-05-11 ASSESSMENT — PATIENT HEALTH QUESTIONNAIRE - PHQ9
SUM OF ALL RESPONSES TO PHQ QUESTIONS 1-9: 5
SUM OF ALL RESPONSES TO PHQ QUESTIONS 1-9: 5
10. IF YOU CHECKED OFF ANY PROBLEMS, HOW DIFFICULT HAVE THESE PROBLEMS MADE IT FOR YOU TO DO YOUR WORK, TAKE CARE OF THINGS AT HOME, OR GET ALONG WITH OTHER PEOPLE: SOMEWHAT DIFFICULT

## 2021-05-11 ASSESSMENT — ANXIETY QUESTIONNAIRES
5. BEING SO RESTLESS THAT IT IS HARD TO SIT STILL: NOT AT ALL
7. FEELING AFRAID AS IF SOMETHING AWFUL MIGHT HAPPEN: NOT AT ALL
6. BECOMING EASILY ANNOYED OR IRRITABLE: SEVERAL DAYS
GAD7 TOTAL SCORE: 5
1. FEELING NERVOUS, ANXIOUS, OR ON EDGE: SEVERAL DAYS
GAD7 TOTAL SCORE: 5
3. WORRYING TOO MUCH ABOUT DIFFERENT THINGS: SEVERAL DAYS
GAD7 TOTAL SCORE: 5
4. TROUBLE RELAXING: SEVERAL DAYS
7. FEELING AFRAID AS IF SOMETHING AWFUL MIGHT HAPPEN: NOT AT ALL
2. NOT BEING ABLE TO STOP OR CONTROL WORRYING: SEVERAL DAYS

## 2021-05-11 ASSESSMENT — MIFFLIN-ST. JEOR: SCORE: 1270.2

## 2021-05-11 NOTE — PROGRESS NOTES
SUBJECTIVE:   CC: Danyell Orr is an 30 year old woman who presents for preventive health visit.       Patient has been advised of split billing requirements and indicates understanding: Yes  Healthy Habits:     Getting at least 3 servings of Calcium per day:  Yes    Bi-annual eye exam:  NO    Dental care twice a year:  NO    Sleep apnea or symptoms of sleep apnea:  Daytime drowsiness    Diet:  Regular (no restrictions)    Frequency of exercise:  2-3 days/week    Duration of exercise:  15-30 minutes    Taking medications regularly:  Yes    Medication side effects:  None    PHQ-2 Total Score: 2    Additional concerns today:  No          Today's PHQ-2 Score:   PHQ-2 ( 1999 Pf[employer]) 5/11/2021   Q1: Little interest or pleasure in doing things 1   Q2: Feeling down, depressed or hopeless 1   PHQ-2 Score 2   Q1: Little interest or pleasure in doing things Several days   Q2: Feeling down, depressed or hopeless Several days   PHQ-2 Score 2   Some encounter information is confidential and restricted. Go to Review Flowsheets activity to see all data.       Abuse: Current or Past (Physical, Sexual or Emotional) - No  Do you feel safe in your environment? Yes    Have you ever done Advance Care Planning? (For example, a Health Directive, POLST, or a discussion with a medical provider or your loved ones about your wishes): No, advance care planning information given to patient to review.  Patient declined advance care planning discussion at this time.    Social History     Tobacco Use     Smoking status: Never Smoker     Smokeless tobacco: Never Used   Substance Use Topics     Alcohol use: No     If you drink alcohol do you typically have >3 drinks per day or >7 drinks per week? No    Alcohol Use 5/11/2021   Prescreen: >3 drinks/day or >7 drinks/week? No   Prescreen: >3 drinks/day or >7 drinks/week? -       Reviewed orders with patient.  Reviewed health maintenance and updated orders accordingly - Yes  Labs reviewed in  EPIC  BP Readings from Last 3 Encounters:   05/11/21 128/80   04/28/20 136/82   01/16/20 132/82    Wt Readings from Last 3 Encounters:   05/11/21 60.8 kg (134 lb)   04/28/20 73.8 kg (162 lb 9.6 oz)   01/16/20 73.8 kg (162 lb 12.8 oz)                  Patient Active Problem List   Diagnosis     Anxiety     Tardive dyskinesia     Major depressive disorder     Eczema     Insomnia     Psoriasis     Past Surgical History:   Procedure Laterality Date     C ORAL SURGERY PROCEDURE      wisdom teeth  - had a tooth pulled down       Social History     Tobacco Use     Smoking status: Never Smoker     Smokeless tobacco: Never Used   Substance Use Topics     Alcohol use: No     Family History   Problem Relation Age of Onset     Heart Disease Mother      Coronary Artery Disease Mother         resucistated but has not been the same     Myocardial Infarction Father      Depression Father      Other - See Comments Sister         [place]     Other - See Comments Brother         [place]     Other - See Comments Other         mother remarried     Other - See Comments Brother         [place] ([facility])         Current Outpatient Medications   Medication Sig Dispense Refill     amphetamine-dextroamphetamine (ADDERALL XR) 20 MG 24 hr capsule Take 1 capsule (20 mg) by mouth daily 60 capsule 0     FLUoxetine (PROZAC) 10 MG capsule Take 1 capsule (10 mg) by mouth daily In addition to 20mg capsule for total daily dose of 30mg 90 capsule 3     FLUoxetine (PROZAC) 20 MG capsule Take 1 capsule (20 mg) by mouth daily In addition to 10mg capsule for total daily dose of 30mg 90 capsule 3     ibuprofen (ADVIL/MOTRIN) 600 MG tablet Take 600 mg by mouth every 6 hours as needed        PSYLLIUM PO Take 1 tsp. by mouth daily as needed        No Known Allergies  Recent Labs   Lab Test 05/11/21  1121 12/07/18  0944   A1C  --  5.4   CR 0.59  --    GFRESTIMATED >90  --    GFRESTBLACK >90  --    POTASSIUM 3.8  --    TSH 0.95  --         Breast Cancer  "Screening:    FSH-7:   Breast CA Risk Assessment (FHS-7) 5/11/2021   Did any of your first-degree relatives have breast or ovarian cancer? Yes   Did any of your relatives have bilateral breast cancer? Unknown   Did any man in your family have breast cancer? Unknown   Did any woman in your family have breast and ovarian cancer? Yes   Did any woman in your family have breast cancer before age 50 y? Unknown   Do you have 2 or more relatives with breast and/or ovarian cancer? Yes   Do you have 2 or more relatives with breast and/or bowel cancer? Yes     click delete button to remove this line now  Patient under 40 years of age: Routine Mammogram Screening not recommended.   Pertinent mammograms are reviewed under the imaging tab.    History of abnormal Pap smear: NO - age 21-29 PAP every 3 years recommended     Reviewed and updated as needed this visit by clinical staff                 Reviewed and updated as needed this visit by Provider                Past Medical History:   Diagnosis Date     Eczema 1/10/2020     Insomnia 1/10/2020     Major depressive disorder 1/10/2020     Psoriasis 1/10/2020     Tardive dyskinesia 12/17/2019      Past Surgical History:   Procedure Laterality Date     C ORAL SURGERY PROCEDURE      wisdom teeth  - had a tooth pulled down     OB History   No obstetric history on file.       Review of Systems   Respiratory: Positive for cough and shortness of breath.    Cardiovascular: Positive for palpitations.   Breasts:  Positive for tenderness.   Genitourinary: Positive for frequency, urgency and vaginal discharge.   Musculoskeletal: Positive for arthralgias.   Neurological: Positive for headaches.   Psychiatric/Behavioral: The patient is nervous/anxious.       OBJECTIVE:   /80 (BP Location: Right arm, Cuff Size: Adult Regular)   Pulse 76   Temp 97.3  F (36.3  C) (Tympanic)   Resp 18   Ht 1.549 m (5' 1\")   Wt 60.8 kg (134 lb)   LMP 04/15/2021 (Approximate)   BMI 25.32 kg/m    Physical " Exam  GENERAL: healthy, alert and no distress  EYES: Eyes grossly normal to inspection, PERRL and conjunctivae and sclerae normal  HENT: ear canals and TM's normal, nose and mouth without ulcers or lesions  NECK: no adenopathy, no asymmetry, masses, or scars and thyroid normal to palpation  RESP: lungs clear to auscultation - no rales, rhonchi or wheezes  BREAST: normal without masses, tenderness or nipple discharge and no palpable axillary masses or adenopathy  CV: regular rate and rhythm, normal S1 S2, no S3 or S4, no murmur, click or rub, no peripheral edema and peripheral pulses strong  ABDOMEN: soft, nontender, no hepatosplenomegaly, no masses and bowel sounds normal   (female): normal female external genitalia, normal urethral meatus, vaginal mucosa pink, moist, well rugated, and normal cervix/adnexa/uterus without masses or discharge  MS: no gross musculoskeletal defects noted, no edema  SKIN: no suspicious lesions or rashes  NEURO: Normal strength and tone, mentation intact and speech normal  PSYCH: mentation appears normal, affect normal/bright    Diagnostic Test Results:  Labs reviewed in Epic  Results for orders placed or performed in visit on 05/11/21   Pap imaged thin layer screen reflex to HPV if ASCUS - recommend age 25 - 29     Status: None   Result Value Ref Range    PAP NIL     Copath Report         Patient Name: JING DAVIS  MR#: [id number]  Specimen #: [id number]  Collected: 5/11/2021  Received: 5/12/2021  Reported: 5/13/2021 10:37  Ordering Phy(s): ROSA ALLEN    For improved result formatting, select 'View Enhanced Report Format' under   Linked Documents section.    SPECIMEN/STAIN PROCESS:  Pap imaged thin layer prep screening (Surepath, FocalPoint with guided   screening)       Pap-Cyto x 1, Pap with reflex to HPV if ASCUS x 1    SOURCE: Cervical, endocervical  ----------------------------------------------------------------   Pap imaged thin layer prep screening (Surepath, FocalPoint  with guided   screening)  SPECIMEN ADEQUACY:  Satisfactory for evaluation.  -Transformation zone component present.    CYTOLOGIC INTERPRETATION:    Negative for intraepithelial lesion or malignancy    Electronically signed out by:  CHETAN Rodriguez (ASCP)    CLINICAL HISTORY:    Papanicolaou Test Limitations:  Cervical cytology is a screening test with   limited sensitivity; regular  scre ening is critical for cancer prevention; Pap tests are primarily   effective for the diagnosis/prevention of  squamous cell carcinoma, not adenocarcinomas or other cancers.    COLLECTION SITE:  Client:  [facility]  Location: [facility])    The technical component of this testing was completed at the [facility], with the professional component performed   at the [facility], [address] [place] [phone] ([phone])       CBC with platelets     Status: None   Result Value Ref Range    WBC 9.0 4.0 - 11.0 10e9/L    RBC Count 4.52 3.8 - 5.2 10e12/L    Hemoglobin 13.8 11.7 - 15.7 g/dL    Hematocrit 41.9 35.0 - 47.0 %    MCV 93 78 - 100 fl    MCH 30.5 26.5 - 33.0 pg    MCHC 32.9 31.5 - 36.5 g/dL    RDW 12.3 10.0 - 15.0 %    Platelet Count 266 150 - 450 10e9/L   Basic metabolic panel     Status: Abnormal   Result Value Ref Range    Sodium 135 133 - 144 mmol/L    Potassium 3.8 3.4 - 5.3 mmol/L    Chloride 103 94 - 109 mmol/L    Carbon Dioxide 27 20 - 32 mmol/L    Anion Gap 5 3 - 14 mmol/L    Glucose 83 70 - 99 mg/dL    Urea Nitrogen 11 7 - 30 mg/dL    Creatinine 0.59 0.52 - 1.04 mg/dL    GFR Estimate >90 >60 mL/min/[1.73_m2]    GFR Estimate If Black >90 >60 mL/min/[1.73_m2]    Calcium 8.3 (L) 8.5 - 10.1 mg/dL   TSH with free T4 reflex     Status: None   Result Value Ref Range    TSH 0.95 0.40 - 4.00 mU/L   Chlamydia trachomatis PCR     Status: None    Specimen: Vagina   Result Value Ref  "Range    Specimen Description Vagina     Chlamydia Trachomatis PCR Negative NEG^Negative   Neisseria gonorrhoeae PCR     Status: None    Specimen: Vagina   Result Value Ref Range    Specimen Descrip Vagina     N Gonorrhea PCR Negative NEG^Negative       ASSESSMENT/PLAN:   Danyell was seen today for physical.    Diagnoses and all orders for this visit:    Routine general medical examination at a health care facility  -     Pap imaged thin layer screen reflex to HPV if ASCUS - recommend age 25 - 29  -     CBC with platelets  -     Basic metabolic panel  -     TSH with free T4 reflex    Major depression in complete remission (H)  -     FLUoxetine (PROZAC) 10 MG capsule; Take 1 capsule (10 mg) by mouth daily In addition to 20mg capsule for total daily dose of 30mg  -     FLUoxetine (PROZAC) 20 MG capsule; Take 1 capsule (20 mg) by mouth daily In addition to 10mg capsule for total daily dose of 30mg  -     OFFICE/OUTPT VISIT,EST,LEVL III    Screen for STD (sexually transmitted disease)  -     Chlamydia trachomatis PCR  -     Neisseria gonorrhoeae PCR    Other orders  -     DEPRESSION ACTION PLAN (DAP)        Patient has been advised of split billing requirements and indicates understanding: Yes  COUNSELING:  Reviewed preventive health counseling, as reflected in patient instructions       Regular exercise       Healthy diet/nutrition       Vision screening       Hearing screening       Alcohol Use       Contraception       Family planning       Folic Acid       Safe sex practices/STD prevention    Estimated body mass index is 30.72 kg/m  as calculated from the following:    Height as of 4/28/20: 1.549 m (5' 1\").    Weight as of 4/28/20: 73.8 kg (162 lb 9.6 oz).    Weight management plan: Discussed healthy diet and exercise guidelines    She reports that she has never smoked. She has never used smokeless tobacco.      Counseling Resources:  ATP IV Guidelines  Pooled Cohorts Equation Calculator  Breast Cancer Risk " Calculator  BRCA-Related Cancer Risk Assessment: FHS-7 Tool  FRAX Risk Assessment  ICSI Preventive Guidelines  Dietary Guidelines for Americans, 2010  USDA's MyPlate  ASA Prophylaxis  Lung CA Screening    ZBIGNIEW Yañez CNP  [facility]

## 2021-05-11 NOTE — LETTER
May 13, 2021      Danyell Orr  [address]  [place] [address]        Dear ,    We are writing to inform you of your test results.    gonorrheae and Chlamydia test were negative     Resulted Orders   CBC with platelets   Result Value Ref Range    WBC 9.0 4.0 - 11.0 10e9/L    RBC Count 4.52 3.8 - 5.2 10e12/L    Hemoglobin 13.8 11.7 - 15.7 g/dL    Hematocrit 41.9 35.0 - 47.0 %    MCV 93 78 - 100 fl    MCH 30.5 26.5 - 33.0 pg    MCHC 32.9 31.5 - 36.5 g/dL    RDW 12.3 10.0 - 15.0 %    Platelet Count 266 150 - 450 10e9/L   Basic metabolic panel   Result Value Ref Range    Sodium 135 133 - 144 mmol/L    Potassium 3.8 3.4 - 5.3 mmol/L    Chloride 103 94 - 109 mmol/L    Carbon Dioxide 27 20 - 32 mmol/L    Anion Gap 5 3 - 14 mmol/L    Glucose 83 70 - 99 mg/dL    Urea Nitrogen 11 7 - 30 mg/dL    Creatinine 0.59 0.52 - 1.04 mg/dL    GFR Estimate >90 >60 mL/min/[1.73_m2]      Comment:      Non  GFR Calc  Starting 12/18/2018, serum creatinine based estimated GFR (eGFR) will be   calculated using the Chronic Kidney Disease Epidemiology Collaboration   (CKD-EPI) equation.      GFR Estimate If Black >90 >60 mL/min/[1.73_m2]      Comment:       GFR Calc  Starting 12/18/2018, serum creatinine based estimated GFR (eGFR) will be   calculated using the Chronic Kidney Disease Epidemiology Collaboration   (CKD-EPI) equation.      Calcium 8.3 (L) 8.5 - 10.1 mg/dL   TSH with free T4 reflex   Result Value Ref Range    TSH 0.95 0.40 - 4.00 mU/L   Chlamydia trachomatis PCR   Result Value Ref Range    Specimen Description Vagina     Chlamydia Trachomatis PCR Negative NEG^Negative      Comment:      Negative for C. trachomatis rRNA by transcription mediated amplification.  A negative result by transcription mediated amplification does not preclude   the presence of C. trachomatis infection because results are dependent on   proper and adequate collection, absence of inhibitors, and sufficient  rRNA to   be detected.     Neisseria gonorrhoeae PCR   Result Value Ref Range    Specimen Descrip Vagina     N Gonorrhea PCR Negative NEG^Negative      Comment:      Negative for N. gonorrhoeae rRNA by transcription mediated amplification.  A negative result by transcription mediated amplification does not preclude   the presence of N. gonorrhoeae infection because results are dependent on   proper and adequate collection, absence of inhibitors, and sufficient rRNA to   be detected.         If you have any questions or concerns, please call the clinic at the number listed above.       Sincerely,      ZBIGNIEW Yañez CNP/dw

## 2021-05-12 LAB
C TRACH DNA SPEC QL NAA+PROBE: NEGATIVE
N GONORRHOEA DNA SPEC QL NAA+PROBE: NEGATIVE
SPECIMEN SOURCE: NORMAL
SPECIMEN SOURCE: NORMAL

## 2021-05-12 ASSESSMENT — ANXIETY QUESTIONNAIRES: GAD7 TOTAL SCORE: 5

## 2021-05-13 LAB
COPATH REPORT: NORMAL
PAP: NORMAL

## 2021-05-18 ENCOUNTER — VIRTUAL VISIT (OUTPATIENT)
Dept: PSYCHIATRY | Facility: CLINIC | Age: 31
End: 2021-05-18
Attending: NURSE PRACTITIONER
Payer: COMMERCIAL

## 2021-05-18 ENCOUNTER — TELEPHONE (OUTPATIENT)
Dept: PSYCHIATRY | Facility: CLINIC | Age: 31
End: 2021-05-18

## 2021-05-18 DIAGNOSIS — F84.0 ACTIVE AUTISTIC DISORDER: Primary | ICD-10-CM

## 2021-05-18 PROCEDURE — 99213 OFFICE O/P EST LOW 20 MIN: CPT | Mod: TEL | Performed by: NURSE PRACTITIONER

## 2021-05-18 NOTE — TELEPHONE ENCOUNTER
On May 18, 2021, at 10:07 AM, writer called patient at [phone] to confirm Virtual Visit. Writer unable to make contact with patient. Writer unable to leave detailed voice mail message due to voice mail box not set up yet. Delisa Benito, [facility]

## 2021-05-18 NOTE — PROGRESS NOTES
"TELEPHONE VISIT  Danyell Orr is a 30 year old pt. who is being evaluated via a billable telephone visit.      The patient has been notified of the following:    We have found that certain health care needs can be provided without the need for a physical exam. This service lets us provide the care you need with a short phone conversation. If a prescription is necessary we can send it directly to your pharmacy. If lab work is needed we can place an order for that and you can then stop by our lab to have the test done at a later time. Insurers are generally covering virtual visits as they would in-office visits so billing should not be different than normal.  If for some reason you do get billed incorrectly, you should contact the billing office to correct it and that number is in the AVS .    Patient has given verbal consent for a telephone visit?:  Yes   How would the pt like to obtain the AVS?:  not requested  AVS SmartPhrase [PsychAVS] has been placed in 'Patient Instructions':  N/A     Start Time:  10:36 AM          End Time:  10:49am      Psychiatry Clinic Progress Note                                                                   Danyell Orr is a 30 year old female who returns to the clinic for follow-up care  Therapist: None  PCP: Raissa Garibay  Other Providers: None    Pertinent Background:  See previous notes.  Psych critical item history includes trauma hx.     Interim History                                                                                                        4, 4     The patient is a good historian, reports good treatment adherence and was last seen 4/20/21.  Since the last visit, Danyell reports she is \"good.\"  No significant changes to mood or anxiety.  No concerns with sleep aside from needing new mattress.  Plans to start going to gym.  Continues to take Adderall XR 20mg daily and finds effective.  PCP recently refilled Prozac    4/20/21:  Danyell reports today that she stopped " "taking Adderall daily approximately 1 year ago.  She has been taking intermittently.  She reports she stopped taking due it making her \"fat\" and concern about cardiac health.  She restarted Adderall XR 20mg due to issues with lack of focus.  Advised her to schedule appointment with PCP to discuss concerns about cardiac health. She does not endorse any symptoms associated with hypertension.  She does not have any recent vital readings but past readings have not been of remarkable concern.      2/3/21: Danyell again reports she is \"good.\"  No remarkable concerns with depression or anxiety.  Currently staying at her brother's home in [place].  She is helping her brother and sister-in-law take care of .  Unsure how long she'll be staying.      11/10/20: Danyell reports she is \"good.\" Mood is positive.  No concerns with anxiety. Niece born at end of October which has been beneficial to mood.  Sleep is not a concern.      20: Danyell reports she is \"doing alright.\"  Continues to socialize with her friends virtually.  Also reading and watching television.  Danyell reports she has lost approximately 30 lbs since stopping Abilify.  Sleep is \"ok.\"  She also shares her excitement about becoming an aunt again.  Danyell also will be moving in October.      6/3/20: Danyell reports the increase in Prozac was helpful as it decreased irritability, improved depression, and decreased anxiety.  No concerns with side effects.  No concerns with sleep.  Continues to socialize with friend fairly regularly.      4/3/20: Danyell reports she's doing as \"good as I can now.\" Stress and anxiety have increased due Covid 19 pandemic.  She misses socializing with her friends.  No concerns with uncontrollable blinking that was issue last .  Sleep is not a concern.  She is requesting an increase in antidepressant.      20: Danyell reports she is doing \"well.\"  Danyell does not endorse concern regarding excessively blinking which was an issue in October.  " "Stopped since Abilify discontinued.  Sleep is \"ok.\"  Continues to socialize with friends.  Saw neurologist on 1/10/19.  Overall, Danyell is doing really well.      11/22/19: Danyell reports blinking has stopped after Abilify was discontinued.  No blinking observed during appointment.  She is unsure how many days after discontinuation the symptoms subsided.  Danyell has not reported any worsening of mental health symptoms.  Affect bright and appropriately talkative.  Sleep \"ok\" but not a concern.  Adderall continues to be helpful.      8/30/19: Danyell reports she continues to do well.  Very bright affect and appropriately talkative.  No concerns with symptoms.  Continues to spend time socializing with friends.  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  Adderall continues to be effective with management of attention. No concerns with sleep, anxiety, and appetite suppression.        6/14/19: Danyell reports she is \"good!\"  Affect quite bright today and talkative.  Again reports no significant changes.  She talks with her brother frequently.  Danyell reports he sleep did improve after changing when she takes her Abilify and Prozac from bedtime to morning.      3/22/19: Danyell reports she is doing \"good.\"  No significant changes since last visit.  Her brother, Onesimo, moved to [place] and she reports she is managing change.  She does report difficulty staying asleep and is probably attributable to her taking Prozac and Abilify at bedtime.  Advised her to take in the morning.      Recent Symptoms:   Depression:  occasional low mood  Elevated:  none  Psychosis:  none  Anxiety:  occasional worry   Panic Attack:  none  Trauma Related:  none     Recent Substance Use:  Alcohol- no , Tobacco- no , Caffeine- coffee/ tea [1 cup], Opioids- no    Narcan Kit- N/A , Cannabis- no  and Other Illicit Drugs-none        Social/ Family History                                  [per patient report]                                 1ea,1ea "   FINANCIAL SUPPORT- not working.  Has not worked since graduating from high school    CHILDREN- None       LIVING SITUATION- Lives with mother and stepfather in [place], [place]      LEGAL- None  EARLY HISTORY/ EDUCATION- Grew up in [place], [place].  Graduated from high school.  Received special education accommodations while in high school  SOCIAL/ SPIRITUAL SUPPORT- close friends and brother       TRAUMA HISTORY (self-report)- None  FEELS SAFE AT HOME- Yes  FAMILY HISTORY-  unknown    Medical / Surgical History                                                                                                                  Patient Active Problem List   Diagnosis     Anxiety     Tardive dyskinesia     Major depressive disorder     Eczema     Insomnia     Psoriasis       Past Surgical History:   Procedure Laterality Date     C ORAL SURGERY PROCEDURE      wisdom teeth  - had a tooth pulled down        Medical Review of Systems                                                                                                    2,10   The remainder of the review of systems is noncontributory  Allergy                                Patient has no known allergies.  Current Medications                                                                                                       Current Outpatient Medications   Medication Sig Dispense Refill     amphetamine-dextroamphetamine (ADDERALL XR) 20 MG 24 hr capsule Take 1 capsule (20 mg) by mouth daily 60 capsule 0     FLUoxetine (PROZAC) 10 MG capsule Take 1 capsule (10 mg) by mouth daily In addition to 20mg capsule for total daily dose of 30mg 90 capsule 3     FLUoxetine (PROZAC) 20 MG capsule Take 1 capsule (20 mg) by mouth daily In addition to 10mg capsule for total daily dose of 30mg 90 capsule 3     ibuprofen (ADVIL/MOTRIN) 600 MG tablet Take 600 mg by mouth every 6 hours as needed        PSYLLIUM PO Take 1 tsp. by mouth daily as needed        Vitals                                 "                                                                                       3, 3   There were no vitals taken for this visit.   Mental Status Exam                                                                                    9, 14 cog gs     Alertness: alert  and oriented  Appearance: unable to assess  Behavior/Demeanor: cooperative and pleasant, with unable to assess eye contact   Speech: normal  Language: no problems  Psychomotor: unable to assess  Mood: \"good\"  Affect: unable to assess; was congruent to mood; was congruent to content  Thought Process/Associations: unremarkable  Thought Content:  Reports none;  Denies suicidal ideation and violent ideation  Perception:  Reports none;  Denies auditory hallucinations and visual hallucinations  Insight: adequate  Judgment: good  Cognition: (6) does  appear grossly intact; formal cognitive testing was not done  Gait/Station and/or Muscle Strength/Tone: unable to assess    Labs and Data                                                                                                                 Rating Scales:    PHQ9    PHQ9 Today:  Not completed    PHQ-9 SCORE 6/14/2019 8/30/2019 5/11/2021   PHQ-9 Total Score MyChart - - 5 (Mild depression)   PHQ-9 Total Score 1 0 5         Diagnosis and Assessment                                                                             m2, h3     Today the following issues were addressed:    1) Major Depressive Disorder, recurrent, full remission  2) Autism Spectrum Disorder (Hx)     [place] Prescription Monitoring Program [] was checked today:  indicates Danyell has been regularly filling Adderall XR 20mg #60 for past 12 months.       Plan                                                                                                                    m2, h3      1) Medication Management  Restart Adderall XR 20mg daily  Continue Prozac 30mg daily    RTC: 3 months.  Refills not needed today but will be needed prior " to next visit    CRISIS NUMBERS:   Provided routinely in AVS.    Treatment Risk Statement:  The patient understands the risks, benefits, adverse effects and alternatives. Agrees to treatment with the capacity to do so. No medical contraindications to treatment. Agrees to call clinic for any problems. The patient understands to call 911 or go to the nearest ED if life threatening or urgent symptoms occur.      PROVIDER:  ZBIGNIEW Murphy CNP

## 2021-06-14 ENCOUNTER — TELEPHONE (OUTPATIENT)
Dept: PSYCHIATRY | Facility: CLINIC | Age: 31
End: 2021-06-14

## 2021-06-14 NOTE — TELEPHONE ENCOUNTER
"Mother wanted us to know that patient has an \"emergency mental health hospitalization\".  Per chart review, patient is inpatient at [facility]  And mother provided the following contact information: Patient's Unit phone number [phone].    Writer told mother that patient would need to sign an RONALDO in order for those providers to be able to consult with us.    Writer called hospital and provided them with our contact information in case patient is willing to sign an RONALDO and inpatient team would like to reach out.      "

## 2021-06-25 ENCOUNTER — TELEPHONE (OUTPATIENT)
Dept: PSYCHIATRY | Facility: CLINIC | Age: 31
End: 2021-06-25

## 2021-06-25 NOTE — TELEPHONE ENCOUNTER
Family is requesting a DA for patient to be sent to:    [facility]   [email or web address] ([facility])    Writer informed family that we would need a RONALDO completed in order to send that information to [facility].  Father provided the following e-mail for RONALDO to be sent: [email or web address]    Writer sent blank RONALDO to step-dad.    Routed request to provider to address at next week's appointment on 6/30.

## 2021-06-30 ENCOUNTER — VIRTUAL VISIT (OUTPATIENT)
Dept: PSYCHIATRY | Facility: CLINIC | Age: 31
End: 2021-06-30
Attending: NURSE PRACTITIONER
Payer: COMMERCIAL

## 2021-06-30 ENCOUNTER — TELEPHONE (OUTPATIENT)
Dept: PSYCHIATRY | Facility: CLINIC | Age: 31
End: 2021-06-30

## 2021-06-30 DIAGNOSIS — F29 PSYCHOSIS, UNSPECIFIED PSYCHOSIS TYPE (H): Primary | ICD-10-CM

## 2021-06-30 DIAGNOSIS — F41.9 ANXIETY: ICD-10-CM

## 2021-06-30 PROCEDURE — 99214 OFFICE O/P EST MOD 30 MIN: CPT | Mod: GT | Performed by: NURSE PRACTITIONER

## 2021-06-30 RX ORDER — RISPERIDONE 4 MG/1
4 TABLET ORAL AT BEDTIME
Qty: 30 TABLET | Refills: 0 | Status: SHIPPED | OUTPATIENT
Start: 2021-06-30 | End: 2021-07-14
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
## 2021-06-30 NOTE — TELEPHONE ENCOUNTER
On June 30, 2021, at 10:38 AM, writer called patient at [phone] to confirm Virtual Visit. Writer unable to make contact with patient. Writer unable to leave detailed voice mail message due to voice mail box not set up yet. Delisa Benito, [facility]

## 2021-07-01 NOTE — TELEPHONE ENCOUNTER
Writer got the following e-mail from patient's father:    From: [email or web address] <[email or web address]>  Sent: Wednesday, June 30, 2021 2:04 PM  To: [email or web address]>  Subject: ATTN: Lisa Gonzalez,    Kierra Recinos has informed me that the Diagnostic Assessment   for Danyell Orr must be less than 6 months old.    I believe you told me the one you have is older than that.    If that is the case, we will need a new Diagnostic Assessment.  Please contact me so we can get an appointment for Danyell.    Please contact Kierra Recinos if you need more information on what they require    Kierra Recinos, [facility]  [phone]  [email or web address]    Thank you,   Julio Cesar Mercer  Debra Orr Sylvie  [phone]  [email or web address]    Routed to provider to confirm necessity to get 1 hour appointment on the books.

## 2021-07-08 ENCOUNTER — TELEPHONE (OUTPATIENT)
Dept: PSYCHIATRY | Facility: CLINIC | Age: 31
End: 2021-07-08

## 2021-07-08 NOTE — TELEPHONE ENCOUNTER
Received e-mail from step father with Consent to Communicate for him, mother, and .  Printed and sent to scanning.

## 2021-07-14 ENCOUNTER — VIRTUAL VISIT (OUTPATIENT)
Dept: PSYCHIATRY | Facility: CLINIC | Age: 31
End: 2021-07-14
Attending: NURSE PRACTITIONER
Payer: COMMERCIAL

## 2021-07-14 DIAGNOSIS — F29 PSYCHOSIS, UNSPECIFIED PSYCHOSIS TYPE (H): ICD-10-CM

## 2021-07-14 PROCEDURE — 99214 OFFICE O/P EST MOD 30 MIN: CPT | Mod: GT | Performed by: NURSE PRACTITIONER

## 2021-07-14 RX ORDER — RISPERIDONE 4 MG/1
4 TABLET ORAL AT BEDTIME
Qty: 30 TABLET | Refills: 0 | Status: SHIPPED | OUTPATIENT
Start: 2021-07-14 | End: 2021-07-30

## 2021-07-14 ASSESSMENT — PATIENT HEALTH QUESTIONNAIRE - PHQ9
SUM OF ALL RESPONSES TO PHQ QUESTIONS 1-9: 4
SUM OF ALL RESPONSES TO PHQ QUESTIONS 1-9: 4
10. IF YOU CHECKED OFF ANY PROBLEMS, HOW DIFFICULT HAVE THESE PROBLEMS MADE IT FOR YOU TO DO YOUR WORK, TAKE CARE OF THINGS AT HOME, OR GET ALONG WITH OTHER PEOPLE: SOMEWHAT DIFFICULT

## 2021-07-14 ASSESSMENT — PAIN SCALES - GENERAL: PAINLEVEL: NO PAIN (0)
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
## 2021-07-14 NOTE — PATIENT INSTRUCTIONS
**For crisis resources, please see the information at the end of this document**     Patient Education      Thank you for coming to the [facility] MENTAL HEALTH & ADDICTION [place] [facility].    Lab Testing:  If you had lab testing today and your results are reassuring or normal they will be mailed to you or sent through [facility] within 7 days. If the lab tests need quick action we will call you with the results. The phone number we will call with results is # [phone] (home) [phone] (work). If this is not the best number please call our clinic and change the number.    Medication Refills:  If you need any refills please call your pharmacy and they will contact us. Our fax number for refills is [phone]. Please allow three business for refill processing. If you need to  your refill at a new pharmacy, please contact the new pharmacy directly. The new pharmacy will help you get your medications transferred.     Scheduling:  If you have any concerns about today's visit or wish to schedule another appointment please call our office during normal business hours [phone] (8-5:00 M-F)    Contact Us:  Please call [phone] during business hours (8-5:00 M-F).  If after clinic hours, or on the weekend, please call  [phone].    Financial Assistance [phone]  [facility] Billing [phone]  Central Billing Office, [facility]: [phone]  [facility] Billing [phone]  Medical Records [phone]  [facility] Patient Bill of Rights [email or web address]       MENTAL HEALTH CRISIS NUMBERS:  For a medical emergency please call  911 or go to the nearest ER.     [facility]:   [facility] -[phone]   Crisis Residence [facility] -[phone]   Walk-In Counseling Center hospitals -[phone]   COPE 24/7 [place] Mobile Team -[phone] (adults)/[phone] (child)  CHILD: [facility]  needs assessment team - [phone]      [facility]:   [facility] - [phone]   Walk-in counseling [facility] - [phone]   Walk-in counseling [facility] - [phone]   Crisis Residence [facility] - [phone]  Urgent Care Adult Mental [place]-[phone] mobile unit/ 24/7 crisis line    National Crisis Numbers:   National Suicide Prevention Lifeline: 8-408-127-TALK (005-118-3987)  Poison Control Center - 0-130-787-2318  M[employer]/resources for a list of additional resources (SOS)  Trans Lifeline a hotline for transgender people 4-359-936-5624  The Geoffrey Project a hotline for LGBT youth 8-573-468-9071  Crisis Text Line: For any crisis 24/7   To: 005192  see www.crisistextline.org  - IF MAKING A CALL FEELS TOO HARD, send a text!         Again thank you for choosing [facility] MENTAL HEALTH & ADDICTION [facility] and please let us know how we can best partner with you to improve you and your family's health.    You may be receiving a survey regarding this appointment. We would love to have your feedback, both positive and negative. The survey is done by an external company, so your answers are anonymous.

## 2021-07-15 ASSESSMENT — PATIENT HEALTH QUESTIONNAIRE - PHQ9: SUM OF ALL RESPONSES TO PHQ QUESTIONS 1-9: 4

## 2021-07-30 ENCOUNTER — TELEPHONE (OUTPATIENT)
Dept: PSYCHIATRY | Facility: CLINIC | Age: 31
End: 2021-07-30

## 2021-07-30 DIAGNOSIS — E55.9 VITAMIN D DEFICIENCY: Primary | ICD-10-CM

## 2021-07-30 DIAGNOSIS — F29 PSYCHOSIS, UNSPECIFIED PSYCHOSIS TYPE (H): ICD-10-CM

## 2021-07-30 RX ORDER — CHOLECALCIFEROL (VITAMIN D3) 50 MCG
1 TABLET ORAL DAILY
Qty: 30 TABLET | Refills: 0 | Status: SHIPPED | OUTPATIENT
Start: 2021-07-30

## 2021-07-30 RX ORDER — PRAZOSIN HYDROCHLORIDE 1 MG/1
1 CAPSULE ORAL AT BEDTIME
Qty: 30 CAPSULE | Refills: 0 | Status: SHIPPED | OUTPATIENT
Start: 2021-07-30 | End: 2021-08-25 | Stop reason: SINTOL

## 2021-07-30 RX ORDER — RISPERIDONE 4 MG/1
4 TABLET ORAL AT BEDTIME
Qty: 30 TABLET | Refills: 0 | Status: SHIPPED | OUTPATIENT
Start: 2021-07-30 | End: 2021-08-25

## 2021-07-30 NOTE — TELEPHONE ENCOUNTER
Writer connected with the provider. He agreed to fill a 30 d/s of prazosin 1 mg and vit D3 2000 mg daily. He asked that the pt schedule a [facility] appt in 1 month from the last visit.     Called the pt's step father, Julio Cesar and informed him that writer will fill the above medications, plus risperidone as the pt is now scheduled for 8/18. Asked that refill requests come by phone or MyChart going forward, which he agreed to do.    Regarding the tics and blinking, Julio Cesar reports this started again right after the pt's discharge. He feels it's getting progressively worse with time. Writer agreed to relay this to the provider.

## 2021-07-30 NOTE — TELEPHONE ENCOUNTER
"Writer received the following email from the pt's parents:    From: [email or web address] <[email or web address]>  Sent: Thursday, July 29, 2021 9:36 AM  To: [email or web address] [email or web address]>  Subject: Meds for Danyell Gonzalez,    Shortly after Danyell was released from the hospital, she was prescribed a medication for night terrors.  It is \"PRAZOSIN HCL 1MG CAPS\", and we are down to our last day with no refills left.  Instructions are to \"TAKE ONE CAPSULEBY MOUTH AT BEDTIME\"    Please ask Dr. Hoang if he wants her to continue this medication.   If so, please send a prescription to [place], [place] ([phone])  so I can pick it up later today    Danyell is also taking a RISPERIDONE tablet, prescribed by Dr. Hoang at bedtime    She also takes a 2,000 unit vitamin D3 tablet every morning (this prescription also ran out)    We have also noticed that the facial tics and eye blinking are more pronounced now    Do you know when she needs her next appointment with Dr. Hoang?    Please call or email with any questions or comments    Thanks,   Debra Mercer  [phone]  [email or web address]  "

## 2021-08-11 ENCOUNTER — TELEPHONE (OUTPATIENT)
Dept: PSYCHIATRY | Facility: CLINIC | Age: 31
End: 2021-08-11

## 2021-08-11 NOTE — TELEPHONE ENCOUNTER
Viktor Ordaz APRN CNP Labossiere, Laura, MARK Patel,     For some reason Danyell's note from 7/14 is a diagnostic assessment.  I cannot recall why.  Possibly for benefits? Could you please call Danyell, possibly talk to her parents, tomorrow morning and ask if necessary?  I will finish tomorrow before I leave state     Thanks so much     Viktor Hernandez APRN CNP Labossiere, Laura, RN  Hello,       Did some investigating and appears that Danyell's  is requesting.  Could you call and see if assessment done while she was admitted at [facility] will suffice?  I don't want to charge them for something that has already been completed.     Thanks again     Viktor

## 2021-08-11 NOTE — TELEPHONE ENCOUNTER
Called Saritha pt's CM at [phone]. No answer. LVM with reason for phone call. Requested a c/b to confirm if she needs a DA from Viktor Ordaz.

## 2021-08-18 ENCOUNTER — VIRTUAL VISIT (OUTPATIENT)
Dept: PSYCHIATRY | Facility: CLINIC | Age: 31
End: 2021-08-18
Attending: NURSE PRACTITIONER
Payer: COMMERCIAL

## 2021-08-18 ENCOUNTER — TELEPHONE (OUTPATIENT)
Dept: PSYCHIATRY | Facility: CLINIC | Age: 31
End: 2021-08-18

## 2021-08-18 DIAGNOSIS — F29 PSYCHOSIS, UNSPECIFIED PSYCHOSIS TYPE (H): ICD-10-CM

## 2021-08-18 DIAGNOSIS — F84.0 ACTIVE AUTISTIC DISORDER: Primary | ICD-10-CM

## 2021-08-18 PROCEDURE — 99213 OFFICE O/P EST LOW 20 MIN: CPT | Mod: GT | Performed by: NURSE PRACTITIONER

## 2021-08-18 ASSESSMENT — PATIENT HEALTH QUESTIONNAIRE - PHQ9
10. IF YOU CHECKED OFF ANY PROBLEMS, HOW DIFFICULT HAVE THESE PROBLEMS MADE IT FOR YOU TO DO YOUR WORK, TAKE CARE OF THINGS AT HOME, OR GET ALONG WITH OTHER PEOPLE: SOMEWHAT DIFFICULT
SUM OF ALL RESPONSES TO PHQ QUESTIONS 1-9: 6
SUM OF ALL RESPONSES TO PHQ QUESTIONS 1-9: 6

## 2021-08-18 ASSESSMENT — PAIN SCALES - GENERAL: PAINLEVEL: NO PAIN (0)

## 2021-08-18 NOTE — PATIENT INSTRUCTIONS
**For crisis resources, please see the information at the end of this document**     Patient Education      Thank you for coming to the [facility] MENTAL HEALTH & ADDICTION [place] [facility].    Lab Testing:  If you had lab testing today and your results are reassuring or normal they will be mailed to you or sent through [facility] within 7 days. If the lab tests need quick action we will call you with the results. The phone number we will call with results is # [phone] (home) [phone] (work). If this is not the best number please call our clinic and change the number.    Medication Refills:  If you need any refills please call your pharmacy and they will contact us. Our fax number for refills is [phone]. Please allow three business for refill processing. If you need to  your refill at a new pharmacy, please contact the new pharmacy directly. The new pharmacy will help you get your medications transferred.     Scheduling:  If you have any concerns about today's visit or wish to schedule another appointment please call our office during normal business hours [phone] (8-5:00 M-F)    Contact Us:  Please call [phone] during business hours (8-5:00 M-F).  If after clinic hours, or on the weekend, please call  [phone].    Financial Assistance [phone]  [facility] Billing [phone]  Central Billing Office, [facility]: [phone]  [facility] Billing [phone]  Medical Records [phone]  [facility] Patient Bill of Rights [email or web address]       MENTAL HEALTH CRISIS NUMBERS:  For a medical emergency please call  911 or go to the nearest ER.     [facility]:   [facility] -[phone]   Crisis Residence [facility] -[phone]   Walk-In Counseling Center [facility] -[phone]   COPE 24/7 [place] Mobile Team -[phone] (adults)/[phone] (child)  CHILD: [facility]  needs assessment team - [phone]      [facility]:   [facility] - [phone]   Walk-in counseling [facility] - [phone]   Walk-in counseling [facility] - [phone]   Crisis Residence [facility] - [phone]  Urgent Care Adult Mental [place]-[phone] mobile unit/ 24/7 crisis line    National Crisis Numbers:   National Suicide Prevention Lifeline: 0-218-272-TALK (625-687-7488)  Poison Control Center - 5-699-984-8467  [employer]/resources for a list of additional resources (SOS)  Trans Lifeline a hotline for transgender people 7-424-430-8860  The Geoffrey Project a hotline for LGBT youth 0-695-236-0694  Crisis Text Line: For any crisis 24/7   To: 806719  see www.crisistextline.org  - IF MAKING A CALL FEELS TOO HARD, send a text!         Again thank you for choosing [facility] MENTAL HEALTH & ADDICTION [facility] and please let us know how we can best partner with you to improve you and your family's health.    You may be receiving a survey regarding this appointment. We would love to have your feedback, both positive and negative. The survey is done by an external company, so your answers are anonymous.

## 2021-08-18 NOTE — TELEPHONE ENCOUNTER
Writer got the following e-mail from Julio Cesar Danyell's guardian.Writer immediately called and let Julio Cesar know that he should not make requests to the psychiatry intake e-mail in the future.    Writehelene Lerma: [email or web address]>  Sent: Wednesday, August 18, 2021 3:43 PM  To: [email or web address] [email or web address]  Subject: Danyell Orr     Lisa,    Please send the most recent Diagnostic Assessment   for Danyell Orr to Kierra Recinos.  (I believe it was July 14)      Kierra Recinos, [facility] [place]    fax:    [phone]    phone:   [phone]      Thanks  Julio Cesar Alvarez  [email or web address]  [phone]    Routed to MARK Patel and provider.
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
## 2021-08-19 ASSESSMENT — PATIENT HEALTH QUESTIONNAIRE - PHQ9: SUM OF ALL RESPONSES TO PHQ QUESTIONS 1-9: 6
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
## 2021-08-19 NOTE — TELEPHONE ENCOUNTER
"Writer received the following email from Kierra:    \"Unfortunately the DA that was done was not complete and does not qualify.  I am anxious to begin working with Danyell, so if you could please let me know when a DA with [facility] could be completed I would appreciate it.  Thank you for reaching out to me and if you have any further questions please let me know.'    Have a great day,    Kierra\"  "

## 2021-08-25 ENCOUNTER — TELEPHONE (OUTPATIENT)
Dept: PSYCHIATRY | Facility: CLINIC | Age: 31
End: 2021-08-25

## 2021-08-25 RX ORDER — RISPERIDONE 3 MG/1
3 TABLET ORAL AT BEDTIME
Qty: 15 TABLET | Refills: 0 | Status: SHIPPED | OUTPATIENT
Start: 2021-08-25 | End: 2021-09-07

## 2021-08-25 NOTE — TELEPHONE ENCOUNTER
Spoke with Danyell on phone.  Since discontinuing Prazosin last week she has not had any nightmares.  Continues to express concern regarding weight gain and is asking to stop Risperdal.  Will decrease to Risperdal 3mg and meet with her again on 9/7

## 2021-09-03 NOTE — TELEPHONE ENCOUNTER
Received incoming phone call from Julio Cesar, pt's father. He was wondering if the DA has been sent to Kierra. Informed Julio Cesar that writer has contacted Kierra multiple times for clarification as to what is needed with no response. Agreed to reach out to her one more time by phone and email. Asked that he also encourage Kierra to respond to this writer. Julio Cesar agreed to this plan. Will ask if Viktor can complete a DA on Tuesday during the patient's appt.

## 2021-09-03 NOTE — TELEPHONE ENCOUNTER
Writer was able to connect with Kierra by phone. She confirmed a Diagnostic Verification Form is needed with the DA note. Even though the information will be redundant, it's required. Informed Saritha that the clinic does not have a form and asked if she can fax this. She will work on finding this form and will then email it to this writer directly.

## 2021-09-07 ENCOUNTER — VIRTUAL VISIT (OUTPATIENT)
Dept: PSYCHIATRY | Facility: CLINIC | Age: 31
End: 2021-09-07
Attending: NURSE PRACTITIONER
Payer: COMMERCIAL

## 2021-09-07 DIAGNOSIS — F29 PSYCHOSIS, UNSPECIFIED PSYCHOSIS TYPE (H): ICD-10-CM

## 2021-09-07 PROCEDURE — 99213 OFFICE O/P EST LOW 20 MIN: CPT | Mod: GT | Performed by: NURSE PRACTITIONER

## 2021-09-07 RX ORDER — RISPERIDONE 3 MG/1
3 TABLET ORAL AT BEDTIME
Qty: 30 TABLET | Refills: 0 | Status: SHIPPED | OUTPATIENT
Start: 2021-09-07 | End: 2021-10-05

## 2021-09-07 ASSESSMENT — PATIENT HEALTH QUESTIONNAIRE - PHQ9
SUM OF ALL RESPONSES TO PHQ QUESTIONS 1-9: 2
SUM OF ALL RESPONSES TO PHQ QUESTIONS 1-9: 2
10. IF YOU CHECKED OFF ANY PROBLEMS, HOW DIFFICULT HAVE THESE PROBLEMS MADE IT FOR YOU TO DO YOUR WORK, TAKE CARE OF THINGS AT HOME, OR GET ALONG WITH OTHER PEOPLE: SOMEWHAT DIFFICULT

## 2021-09-07 ASSESSMENT — PAIN SCALES - GENERAL: PAINLEVEL: NO PAIN (0)

## 2021-09-07 NOTE — NURSING NOTE
"VIDEO VISIT  aDnyell Orr is a 30 year old patient who is being evaluated via a billable video visit.      The patient has been notified of following:   \"This video visit will be conducted via a call between you and your physician/provider. We have found that certain health care needs can be provided without the need for an in-person physical exam. This service lets us provide the care you need with a video conversation. If a prescription is necessary we can send it directly to your pharmacy. If lab work is needed we can place an order for that and you can then stop by our lab to have the test done at a later time. Insurers are generally covering virtual visits as they would in-office visits so billing should not be different than normal.  If for some reason you do get billed incorrectly, you should contact the billing office to correct it and that number is in the AVS .    Video Conference to be completed via:  [employer]    Patient has given verbal consent for video visit?:  Yes    Patient would prefer that any video invitations be sent by: Send to e-mail at: [email or web address]      How would patient like to obtain AVS?:  [facility]    AVS SmartPhrase [PsychAVS] has been placed in 'Patient Instructions':  Yes    "

## 2021-09-07 NOTE — PATIENT INSTRUCTIONS
**For crisis resources, please see the information at the end of this document**     Patient Education      Thank you for coming to the [facility] [place] [facility].    Lab Testing:  If you had lab testing today and your results are reassuring or normal they will be mailed to you or sent through [employer] within 7 days. If the lab tests need quick action we will call you with the results. The phone number we will call with results is # [phone] (home) [phone] (work). If this is not the best number please call our clinic and change the number.    Medication Refills:  If you need any refills please call your pharmacy and they will contact us. Our fax number for refills is [phone]. Please allow three business for refill processing. If you need to  your refill at a new pharmacy, please contact the new pharmacy directly. The new pharmacy will help you get your medications transferred.     Scheduling:  If you have any concerns about today's visit or wish to schedule another appointment please call our office during normal business hours [phone] (8-5:00 M-F)    Contact Us:  Please call [phone] during business hours (8-5:00 M-F).  If after clinic hours, or on the weekend, please call  [phone].    Financial Assistance [phone]  [facility] Billing [phone]  Central Billing Office, [facility]: [phone]  [place] Billing [phone]  Medical Records [phone]  [place] Patient Bill of Rights [email or web address]       MENTAL HEALTH CRISIS NUMBERS:  For a medical emergency please call  911 or go to the nearest ER.     [facility]:   [facility] -[phone]   Crisis Residence [facility] -[phone]   Walk-In Counseling Center [facility] -[phone]   COPE 24/7 [place] Mobile Team -[phone] (adults)/[phone] (child)  CHILD: [facility]  needs assessment team - [phone]      [facility]:   [facility] - [phone]   Walk-in counseling [facility] - [phone]   Walk-in counseling [facility] - [phone]   Crisis Residence [facility] Res[facility] - [phone]  Urgent Care Adult Mental Vtklmh-[phone] mobile unit/ 24/7 crisis line    National Crisis Numbers:   National Suicide Prevention Lifeline: 9-420-216-TALK (030-861-6675)  Poison Control Center - 6-222-705-3007  [employer]enia/resources for a list of additional resources (SOS)  Trans Lifeline a hotline for transgender people 4-443-710-4104  The [employer] Project a hotline for LGBT youth 7-006-820-3920  Crisis Text Line: For any crisis 24/7   To: 763372  see www.crisistextline.org  - IF MAKING A CALL FEELS TOO HARD, send a text!         Again thank you for choosing [facility] and please let us know how we can best partner with you to improve you and your family's health.    You may be receiving a survey regarding this appointment. We would love to have your feedback, both positive and negative. The survey is done by an external company, so your answers are anonymous.
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
## 2021-09-08 ASSESSMENT — PATIENT HEALTH QUESTIONNAIRE - PHQ9: SUM OF ALL RESPONSES TO PHQ QUESTIONS 1-9: 2

## 2021-09-18 ENCOUNTER — HEALTH MAINTENANCE LETTER (OUTPATIENT)
Age: 31
End: 2021-09-18

## 2021-10-05 ENCOUNTER — VIRTUAL VISIT (OUTPATIENT)
Dept: PSYCHIATRY | Facility: CLINIC | Age: 31
End: 2021-10-05
Attending: NURSE PRACTITIONER
Payer: COMMERCIAL

## 2021-10-05 DIAGNOSIS — F29 PSYCHOSIS, UNSPECIFIED PSYCHOSIS TYPE (H): ICD-10-CM

## 2021-10-05 PROCEDURE — 99213 OFFICE O/P EST LOW 20 MIN: CPT | Mod: GT | Performed by: NURSE PRACTITIONER

## 2021-10-05 RX ORDER — RISPERIDONE 3 MG/1
3 TABLET ORAL AT BEDTIME
Qty: 30 TABLET | Refills: 0 | Status: SHIPPED | OUTPATIENT
Start: 2021-10-05 | End: 2021-11-02

## 2021-10-05 ASSESSMENT — PATIENT HEALTH QUESTIONNAIRE - PHQ9
10. IF YOU CHECKED OFF ANY PROBLEMS, HOW DIFFICULT HAVE THESE PROBLEMS MADE IT FOR YOU TO DO YOUR WORK, TAKE CARE OF THINGS AT HOME, OR GET ALONG WITH OTHER PEOPLE: SOMEWHAT DIFFICULT
SUM OF ALL RESPONSES TO PHQ QUESTIONS 1-9: 4
SUM OF ALL RESPONSES TO PHQ QUESTIONS 1-9: 4

## 2021-10-05 ASSESSMENT — PAIN SCALES - GENERAL: PAINLEVEL: NO PAIN (0)
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
## 2021-10-05 NOTE — PATIENT INSTRUCTIONS
**For crisis resources, please see the information at the end of this document**     Patient Education      Thank you for coming to the [facility] [place] [facility].    Lab Testing:  If you had lab testing today and your results are reassuring or normal they will be mailed to you or sent through [employer] within 7 days. If the lab tests need quick action we will call you with the results. The phone number we will call with results is # [phone] (home) [phone] (work). If this is not the best number please call our clinic and change the number.    Medication Refills:  If you need any refills please call your pharmacy and they will contact us. Our fax number for refills is [phone]. Please allow three business for refill processing. If you need to  your refill at a new pharmacy, please contact the new pharmacy directly. The new pharmacy will help you get your medications transferred.     Scheduling:  If you have any concerns about today's visit or wish to schedule another appointment please call our office during normal business hours [phone] (8-5:00 M-F)    Contact Us:  Please call [phone] during business hours (8-5:00 M-F).  If after clinic hours, or on the weekend, please call  [phone].    Financial Assistance [phone]  [facility] Billing [phone]  Central Billing Office, [facility]: [phone]  [facility] Billing [phone]  Medical Records [phone]  [facility] Patient Bill of Rights [email or web address]       MENTAL HEALTH CRISIS NUMBERS:  For a medical emergency please call  911 or go to the nearest ER.     [facility]:   [facility] -[phone]   Crisis Residence [facility] -[phone]   Walk-In Counseling Center \[facility]\"" -[phone]   COPE 24/7 [place] Mobile Team -[phone] (adults)/[phone] (child)  CHILD: [facility]  needs assessment team - [phone]      [facility]:   [facility] - [phone]   Walk-in counseling [facility] - [phone]   Walk-in counseling [facility] - [phone]   Crisis Residence [facility]idence - [phone]  Urgent Care Adult Mental [facility]-[phone] mobile unit/ 24/7 crisis line    National Crisis Numbers:   National Suicide Prevention Lifeline: 1-330-558-TALK (962-535-8796)  Poison Control Center - 0-981-221-3176  [employer]/resources for a list of additional resources (SOS)  Trans Lifeline a hotline for transgender people 3-611-642-2223  The [employer] a hotline for LGBT youth 6-966-834-8291  Crisis Text Line: For any crisis 24/7   To: 891875  see www.crisistextline.org  - IF MAKING A CALL FEELS TOO HARD, send a text!         Again thank you for choosing [facility] M[facility] & ADDI[facility] and please let us know how we can best partner with you to improve you and your family's health.    You may be receiving a survey regarding this appointment. We would love to have your feedback, both positive and negative. The survey is done by an external company, so your answers are anonymous.

## 2021-10-06 ASSESSMENT — PATIENT HEALTH QUESTIONNAIRE - PHQ9: SUM OF ALL RESPONSES TO PHQ QUESTIONS 1-9: 4

## 2021-10-21 NOTE — TELEPHONE ENCOUNTER
DA printed form 7/14 and attached to completed DVF form. This was faxed to [employer] at [phone].

## 2021-10-28 NOTE — TELEPHONE ENCOUNTER
"Marcella Storey, Amanda Beckman RN; Keri Reynoso      This is being faxed as we speak+      Joanne :)             Previous Messages       ----- Message -----   From: Amanda Holcomb RN   Sent: 10/26/2021   3:42 PM CDT   To: Marcella Flynn CMA   Subject: Another Request                                   Hi Keri and Joanne,     I have another request. There is a diagnostic verification form for this patient that needs to be re-faxed to [employer]. The DVF form should be in the bottom left drawer of my desk in the \"medical opinion/DA\" folder. I believe the 7/14/21 diagnostic assessment note should be attached. If not, can you print this and then fax the DVF form + DA note to [employer] at [phone]? Thank you both for being so helpful today. I appreciate you both so much!     -Amanda"

## 2021-11-02 ENCOUNTER — VIRTUAL VISIT (OUTPATIENT)
Dept: PSYCHIATRY | Facility: CLINIC | Age: 31
End: 2021-11-02
Attending: NURSE PRACTITIONER
Payer: COMMERCIAL

## 2021-11-02 ENCOUNTER — TELEPHONE (OUTPATIENT)
Dept: PSYCHIATRY | Facility: CLINIC | Age: 31
End: 2021-11-02

## 2021-11-02 DIAGNOSIS — F29 PSYCHOSIS, UNSPECIFIED PSYCHOSIS TYPE (H): ICD-10-CM

## 2021-11-02 PROCEDURE — 99214 OFFICE O/P EST MOD 30 MIN: CPT | Mod: GT | Performed by: NURSE PRACTITIONER

## 2021-11-02 RX ORDER — RISPERIDONE 3 MG/1
3 TABLET ORAL AT BEDTIME
Qty: 30 TABLET | Refills: 2 | Status: SHIPPED | OUTPATIENT
Start: 2021-11-02 | End: 2023-06-15

## 2021-11-02 NOTE — TELEPHONE ENCOUNTER
On November 2, 2021, at 3:52 PM, writer called patient at mobile to confirm Virtual Visit. Writer unable to make contact with patient. Writer unable to leave detailed voice mail message due to voice mail box not set up yet. MAURICE Bullock    On November 2, 2021, at 4:15 PM, writer called patient at mobile to confirm Virtual Visit. Writer unable to make contact with patient. A link to the video visit was sent to the patient's email address and mobile phone number. Foster Fontanez, EMT
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
## 2021-11-12 ENCOUNTER — OFFICE VISIT (OUTPATIENT)
Dept: FAMILY MEDICINE | Facility: CLINIC | Age: 31
End: 2021-11-12
Payer: COMMERCIAL

## 2021-11-12 VITALS
HEART RATE: 76 BPM | TEMPERATURE: 97.4 F | BODY MASS INDEX: 28.51 KG/M2 | RESPIRATION RATE: 16 BRPM | HEIGHT: 61 IN | WEIGHT: 151 LBS | SYSTOLIC BLOOD PRESSURE: 110 MMHG | DIASTOLIC BLOOD PRESSURE: 64 MMHG

## 2021-11-12 DIAGNOSIS — F29 PSYCHOSIS, UNSPECIFIED PSYCHOSIS TYPE (H): ICD-10-CM

## 2021-11-12 DIAGNOSIS — Z30.011 ENCOUNTER FOR INITIAL PRESCRIPTION OF CONTRACEPTIVE PILLS: Primary | ICD-10-CM

## 2021-11-12 LAB — HCG UR QL: NEGATIVE

## 2021-11-12 PROCEDURE — 99215 OFFICE O/P EST HI 40 MIN: CPT | Performed by: NURSE PRACTITIONER

## 2021-11-12 PROCEDURE — 81025 URINE PREGNANCY TEST: CPT | Performed by: NURSE PRACTITIONER

## 2021-11-12 RX ORDER — LEVONORGESTREL/ETHIN.ESTRADIOL 0.1-0.02MG
1 TABLET ORAL DAILY
Qty: 84 TABLET | Refills: 3 | Status: SHIPPED | OUTPATIENT
Start: 2021-11-12 | End: 2023-06-15

## 2021-11-12 ASSESSMENT — MIFFLIN-ST. JEOR: SCORE: 1342.31

## 2021-11-12 NOTE — PATIENT INSTRUCTIONS
Referral placed for new psychiatrics     I will contact Dr. Ravi to review starting medication for depression and follow-up with you.     Patient Education     Birth Control: The Pill    Birth control pills contain hormones that help prevent pregnancy. The pills are prescribed by your healthcare provider. There are many types of birth control pills available. If you have side effects from one type of pill, tell your healthcare provider. He or she may be able to prescribe a pill that works better for you.  Pregnancy rates  Talk to your healthcare provider about the effectiveness of this birth control method.  Using the pill    Take one pill daily. Take it at around the same time each day.    Follow your healthcare provider s guidelines on when to start your first pack of pills. You may need to use another form of birth control for a week or more after you start.    Know what to do if you forget to take a pill. (Consult your healthcare provider or check the package.) If you miss more than one pill, you may need to use a backup method of birth control for a week or more.    Pros    Low pregnancy rate    No interruption to sex    Easy to use    Can help make periods more regular    May lower your risk of ovarian cysts and certain cancers    May decrease menstrual cramps, menstrual flow, and acne    Cons    Does not protect against sexually transmitted infections (STIs)    Requires taking a pill on time each day    May not work as well when taken with certain other medicines (check with your pharmacist)    May cause side effects such as nausea, irregular bleeding, headaches, breast tenderness, fatigue, or mood changes (these often go away within 3 months)    May increase the risk of blood clots, heart attack, and stroke    The pill may not be for you  The pill may not be for you if:    You are a smoker and over age 35    You have high blood pressure or gallbladder, liver, cerebrovascular or heart disease    You have  diabetes, migraines, blood clot in the vein or artery, lupus, depression, certain lipid disorders, or take medicines that interfere with the pill  In these cases, discuss the risks with your healthcare provider.  Arlet last reviewed this educational content on 4/1/2020 2000-2021 [employer]. All rights reserved. This information is not intended as a substitute for professional medical care. Always follow your healthcare professional's instructions.

## 2021-11-12 NOTE — PROGRESS NOTES
"  Assessment & Plan     (Z30.011) Encounter for initial prescription of contraceptive pills  (primary encounter diagnosis)  Comment: Patient would like to start birth control states she is not presently sexually active Tanika test was negative patient was started on oral birth control  Plan: HCG Qual, Urine ([id number]),         levonorgestrel-ethinyl estradiol (AVIANE)         0.1-20 MG-MCG tablet, CANCELED: HCG Qual, Urine        ([id number])      (F29) Psychosis, unspecified psychosis type (H)  Comment: Patient was seen by psychiatry recently psychiatrist has retired he did provide her with a resource patient would like another referral to psychiatry for management of her mental health referral has been made  Plan: MENTAL HEALTH REFERRAL  - Adult; Psychiatry;         Psychiatry; Collaborative Care Psychiatry         Service/Bridge to Long-Term Psychiatry as         indicated ([phone]); Yes; Other: Enter         in Comments box; No; Other: [facility]         [phone]; We will co...            45 minutes spent on the date of the encounter doing chart review, history and exam, documentation and further activities per the note      BMI:   Estimated body mass index is 28.53 kg/m  as calculated from the following:    Height as of this encounter: 1.549 m (5' 1\").    Weight as of this encounter: 68.5 kg (151 lb).   Weight management plan: Discussed healthy diet and exercise guidelines    Depression Screening Follow Up    PHQ 11/12/2021   PHQ-9 Total Score 5   Q9: Thoughts of better off dead/self-harm past 2 weeks Several days   F/U: Thoughts of suicide or self-harm -   F/U: Self harm-plan -   F/U: Self-harm action -   F/U: Safety concerns -     Patient is not suicidal patient does have underlying mental health issues and seen by psychiatry will be referred for her to establish care with another psychiatrist patient is nonsuicidal            Follow Up      Follow Up Actions Taken  Crisis resource information " "provided in the After Visit Summary  Referred patient back to mental health provider    Discussed the following ways the patient can remain in a safe environment:  remove alcohol, remove drugs and be around others  See Patient Instructions    No follow-ups on file.    ZBIGNIEW Yañez CNP  [facility]  Contact broderick Child is a 31 year old who presents for the following health issues     HPI     Patient would like to discuss birth control options.        Review of Systems   Constitutional, HEENT, cardiovascular, pulmonary, gi and gu systems are negative, except as otherwise noted.      Objective    /64 (BP Location: Right arm, Cuff Size: Adult Regular)   Pulse 76   Temp 97.4  F (36.3  C) (Tympanic)   Resp 16   Ht 1.549 m (5' 1\")   Wt 68.5 kg (151 lb)   BMI 28.53 kg/m    There is no height or weight on file to calculate BMI.  Physical Exam   GENERAL: healthy, alert and no distress  EYES: Eyes grossly normal to inspection, PERRL and conjunctivae and sclerae normal  NECK: no adenopathy, no asymmetry, masses, or scars and thyroid normal to palpation  RESP: lungs clear to auscultation - no rales, rhonchi or wheezes  CV: regular rate and rhythm, normal S1 S2, no S3 or S4, no murmur, click or rub, no peripheral edema and peripheral pulses strong  MS: no gross musculoskeletal defects noted, no edema  SKIN: no suspicious lesions or rashes  NEURO: Normal strength and tone, mentation intact and speech normal  PSYCH: mentation appears normal, affect normal/bright    Results for orders placed or performed in visit on 11/12/21   HCG Qual, Urine ([id number])     Status: Normal   Result Value Ref Range    hCG Urine Qualitative Negative Negative               "

## 2021-11-13 ASSESSMENT — PATIENT HEALTH QUESTIONNAIRE - PHQ9: SUM OF ALL RESPONSES TO PHQ QUESTIONS 1-9: 5

## 2021-11-16 ENCOUNTER — TELEPHONE (OUTPATIENT)
Dept: PSYCHIATRY | Facility: CLINIC | Age: 31
End: 2021-11-16
Payer: COMMERCIAL

## 2021-11-16 NOTE — TELEPHONE ENCOUNTER
Writer called and spoke with patient, provided resources for 3 clinics that offer DBT. Writer provided own contact info if she has any questions/concerns.    PARMINDER Ferro.  Social Work Intern  Direct Phone: [phone]  [facility]

## 2021-11-16 NOTE — TELEPHONE ENCOUNTER
----- Message from ZBIGNIEW Parra CNP sent at 11/16/2021  9:02 AM CST -----  Danyell Soriano is requesting DBT resources.  She does not live in [facility] area so may be tough to find.  Could someone give her a call with any that may be available?    Thanks!!!

## 2022-01-08 ENCOUNTER — LAB (OUTPATIENT)
Dept: LAB | Facility: CLINIC | Age: 32
End: 2022-01-08
Payer: COMMERCIAL

## 2022-01-08 DIAGNOSIS — Z79.899 HIGH RISK MEDICATION USE: ICD-10-CM

## 2022-01-08 DIAGNOSIS — Z79.899 HIGH RISK MEDICATION USE: Primary | ICD-10-CM

## 2022-01-08 LAB
CHOLEST SERPL-MCNC: 175 MG/DL
FASTING STATUS PATIENT QL REPORTED: YES
FASTING STATUS PATIENT QL REPORTED: YES
GLUCOSE BLD-MCNC: 88 MG/DL (ref 70–99)
HBA1C MFR BLD: 5.2 % (ref 0–5.6)
HDLC SERPL-MCNC: 42 MG/DL
LDLC SERPL CALC-MCNC: 119 MG/DL
NONHDLC SERPL-MCNC: 133 MG/DL
PROLACTIN SERPL-MCNC: 122 UG/L (ref 3–27)
TRIGL SERPL-MCNC: 69 MG/DL

## 2022-01-08 PROCEDURE — 36415 COLL VENOUS BLD VENIPUNCTURE: CPT

## 2022-01-08 PROCEDURE — 82947 ASSAY GLUCOSE BLOOD QUANT: CPT

## 2022-01-08 PROCEDURE — 84146 ASSAY OF PROLACTIN: CPT

## 2022-01-08 PROCEDURE — 83036 HEMOGLOBIN GLYCOSYLATED A1C: CPT

## 2022-01-08 PROCEDURE — 80061 LIPID PANEL: CPT

## 2022-03-02 ENCOUNTER — LAB (OUTPATIENT)
Dept: LAB | Facility: CLINIC | Age: 32
End: 2022-03-02
Payer: COMMERCIAL

## 2022-03-02 DIAGNOSIS — Z79.899 ENCOUNTER FOR LONG-TERM (CURRENT) USE OF MEDICATIONS: Primary | ICD-10-CM

## 2022-03-02 LAB
CHOLEST SERPL-MCNC: 186 MG/DL
FASTING STATUS PATIENT QL REPORTED: NO
FASTING STATUS PATIENT QL REPORTED: NO
GLUCOSE BLD-MCNC: 102 MG/DL (ref 70–99)
HBA1C MFR BLD: 5.2 % (ref 0–5.6)
HDLC SERPL-MCNC: 43 MG/DL
LDLC SERPL CALC-MCNC: 124 MG/DL
NONHDLC SERPL-MCNC: 143 MG/DL
PROLACTIN SERPL-MCNC: 18 UG/L (ref 3–27)
TRIGL SERPL-MCNC: 94 MG/DL

## 2022-03-02 PROCEDURE — 36415 COLL VENOUS BLD VENIPUNCTURE: CPT

## 2022-03-02 PROCEDURE — 83036 HEMOGLOBIN GLYCOSYLATED A1C: CPT

## 2022-03-02 PROCEDURE — 82947 ASSAY GLUCOSE BLOOD QUANT: CPT

## 2022-03-02 PROCEDURE — 84146 ASSAY OF PROLACTIN: CPT

## 2022-03-02 PROCEDURE — 80061 LIPID PANEL: CPT

## 2022-06-19 ENCOUNTER — HEALTH MAINTENANCE LETTER (OUTPATIENT)
Age: 32
End: 2022-06-19

## 2022-11-19 ENCOUNTER — HEALTH MAINTENANCE LETTER (OUTPATIENT)
Age: 32
End: 2022-11-19

## 2023-06-15 ENCOUNTER — OFFICE VISIT (OUTPATIENT)
Dept: FAMILY MEDICINE | Facility: CLINIC | Age: 33
End: 2023-06-15
Payer: COMMERCIAL

## 2023-06-15 VITALS
DIASTOLIC BLOOD PRESSURE: 62 MMHG | TEMPERATURE: 97.8 F | HEART RATE: 85 BPM | BODY MASS INDEX: 28.62 KG/M2 | SYSTOLIC BLOOD PRESSURE: 132 MMHG | HEIGHT: 61 IN | RESPIRATION RATE: 20 BRPM | WEIGHT: 151.6 LBS | OXYGEN SATURATION: 98 %

## 2023-06-15 DIAGNOSIS — K92.1 BLOOD IN STOOL: ICD-10-CM

## 2023-06-15 DIAGNOSIS — R10.31 RLQ ABDOMINAL PAIN: Primary | ICD-10-CM

## 2023-06-15 DIAGNOSIS — F29 PSYCHOSIS, UNSPECIFIED PSYCHOSIS TYPE (H): ICD-10-CM

## 2023-06-15 LAB
ERYTHROCYTE [DISTWIDTH] IN BLOOD BY AUTOMATED COUNT: 11.9 % (ref 10–15)
FERRITIN SERPL-MCNC: 39 NG/ML (ref 6–175)
HCT VFR BLD AUTO: 39.2 % (ref 35–47)
HGB BLD-MCNC: 13 G/DL (ref 11.7–15.7)
IRON BINDING CAPACITY (ROCHE): 306 UG/DL (ref 240–430)
IRON SATN MFR SERPL: 34 % (ref 15–46)
IRON SERPL-MCNC: 104 UG/DL (ref 37–145)
MCH RBC QN AUTO: 30 PG (ref 26.5–33)
MCHC RBC AUTO-ENTMCNC: 33.2 G/DL (ref 31.5–36.5)
MCV RBC AUTO: 91 FL (ref 78–100)
PLATELET # BLD AUTO: 235 10E3/UL (ref 150–450)
RBC # BLD AUTO: 4.33 10E6/UL (ref 3.8–5.2)
WBC # BLD AUTO: 5.8 10E3/UL (ref 4–11)

## 2023-06-15 PROCEDURE — 82728 ASSAY OF FERRITIN: CPT | Performed by: NURSE PRACTITIONER

## 2023-06-15 PROCEDURE — 36415 COLL VENOUS BLD VENIPUNCTURE: CPT | Performed by: NURSE PRACTITIONER

## 2023-06-15 PROCEDURE — 83540 ASSAY OF IRON: CPT | Performed by: NURSE PRACTITIONER

## 2023-06-15 PROCEDURE — 83550 IRON BINDING TEST: CPT | Performed by: NURSE PRACTITIONER

## 2023-06-15 PROCEDURE — 85027 COMPLETE CBC AUTOMATED: CPT | Performed by: NURSE PRACTITIONER

## 2023-06-15 PROCEDURE — 99214 OFFICE O/P EST MOD 30 MIN: CPT | Performed by: NURSE PRACTITIONER

## 2023-06-15 RX ORDER — FLUOXETINE 10 MG/1
10 CAPSULE ORAL DAILY
COMMUNITY
Start: 2023-06-01

## 2023-06-15 RX ORDER — QUETIAPINE FUMARATE 100 MG/1
100 TABLET, FILM COATED ORAL AT BEDTIME
COMMUNITY
Start: 2023-06-01

## 2023-06-15 RX ORDER — LAMOTRIGINE 100 MG/1
1 TABLET ORAL
COMMUNITY
Start: 2023-06-01

## 2023-06-15 ASSESSMENT — PATIENT HEALTH QUESTIONNAIRE - PHQ9
SUM OF ALL RESPONSES TO PHQ QUESTIONS 1-9: 5
10. IF YOU CHECKED OFF ANY PROBLEMS, HOW DIFFICULT HAVE THESE PROBLEMS MADE IT FOR YOU TO DO YOUR WORK, TAKE CARE OF THINGS AT HOME, OR GET ALONG WITH OTHER PEOPLE: NOT DIFFICULT AT ALL
SUM OF ALL RESPONSES TO PHQ QUESTIONS 1-9: 5

## 2023-06-15 ASSESSMENT — ANXIETY QUESTIONNAIRES
7. FEELING AFRAID AS IF SOMETHING AWFUL MIGHT HAPPEN: NOT AT ALL
GAD7 TOTAL SCORE: 3
5. BEING SO RESTLESS THAT IT IS HARD TO SIT STILL: NOT AT ALL
4. TROUBLE RELAXING: NOT AT ALL
3. WORRYING TOO MUCH ABOUT DIFFERENT THINGS: NOT AT ALL
2. NOT BEING ABLE TO STOP OR CONTROL WORRYING: NOT AT ALL
7. FEELING AFRAID AS IF SOMETHING AWFUL MIGHT HAPPEN: NOT AT ALL
8. IF YOU CHECKED OFF ANY PROBLEMS, HOW DIFFICULT HAVE THESE MADE IT FOR YOU TO DO YOUR WORK, TAKE CARE OF THINGS AT HOME, OR GET ALONG WITH OTHER PEOPLE?: SOMEWHAT DIFFICULT
GAD7 TOTAL SCORE: 3
IF YOU CHECKED OFF ANY PROBLEMS ON THIS QUESTIONNAIRE, HOW DIFFICULT HAVE THESE PROBLEMS MADE IT FOR YOU TO DO YOUR WORK, TAKE CARE OF THINGS AT HOME, OR GET ALONG WITH OTHER PEOPLE: SOMEWHAT DIFFICULT
GAD7 TOTAL SCORE: 3
6. BECOMING EASILY ANNOYED OR IRRITABLE: MORE THAN HALF THE DAYS
1. FEELING NERVOUS, ANXIOUS, OR ON EDGE: SEVERAL DAYS

## 2023-06-15 ASSESSMENT — PAIN SCALES - GENERAL: PAINLEVEL: MODERATE PAIN (4)

## 2023-06-15 NOTE — PROGRESS NOTES
"  Assessment & Plan     (R10.31) RLQ abdominal pain  (primary encounter diagnosis)  Comment: Work-up in the emergency room negative for any acute findings continues to have intermittent right lower quadrant pain with discolored stools recommend a colonoscopy we will continue to monitor if symptoms worsen patient should be seen based on colonoscopy results will determine further follow-up  Plan: CBC with platelets      (K92.1) Blood in stool  Comment:   Plan: Adult GI  Referral - Procedure Only,         CBC with platelets, Iron and iron binding         capacity, Ferritin            (F29) Psychosis, unspecified psychosis type (H)  Comment: Stable   Plan:  56}   MED REC REQUIRED  Post Medication Reconciliation Status:  Discharge medications reconciled, continue medications without change    BMI:   Estimated body mass index is 28.64 kg/m  as calculated from the following:    Height as of this encounter: 1.549 m (5' 1\").    Weight as of this encounter: 68.8 kg (151 lb 9.6 oz).   Weight management plan: Discussed healthy diet and exercise guidelines    See Patient Instructions    Raissa Garibay, ZBIGNIEW CNP  [facility]    Clinton Child is a 32 year old, presenting for the following health issues:  ER F/U        6/15/2023     8:34 AM   Additional Questions   Roomed by Glo PAZ     ED/UC Followup:    Facility:  [facility]   Date of visit: 6/6/2023  Reason for visit: Abdominal pain  Current Status: alot of discomfort in right side, putting pressure on it hurts, moving a certain way hurts.           Review of Systems   Constitutional, HEENT, cardiovascular, pulmonary, gi and gu systems are negative, except as otherwise noted.      Objective    /62   Pulse 85   Temp 97.8  F (36.6  C) (Tympanic)   Resp 20   Ht 1.549 m (5' 1\")   Wt 68.8 kg (151 lb 9.6 oz)   LMP 06/05/2023 (Exact Date)   SpO2 98%   BMI 28.64 kg/m    Body mass index is 28.64 kg/m .  Physical Exam "   GENERAL: healthy, alert and no distress  EYES: Eyes grossly normal to inspection, PERRL and conjunctivae and sclerae normal  HENT: ear canals and TM's normal, nose and mouth without ulcers or lesions  NECK: no adenopathy, no asymmetry, masses, or scars and thyroid normal to palpation  RESP: lungs clear to auscultation - no rales, rhonchi or wheezes  CV: regular rate and rhythm, normal S1 S2, no S3 or S4, no murmur, click or rub, no peripheral edema and peripheral pulses strong  ABDOMEN: soft, nontender, no hepatosplenomegaly, no masses and bowel sounds normal  MS: no gross musculoskeletal defects noted, no edema  SKIN: no suspicious lesions or rashes  NEURO: Normal strength and tone, mentation intact and speech normal  PSYCH: mentation appears normal, affect normal/bright  Results for orders placed or performed in visit on 06/15/23   CBC with platelets     Status: Normal   Result Value Ref Range    WBC Count 5.8 4.0 - 11.0 10e3/uL    RBC Count 4.33 3.80 - 5.20 10e6/uL    Hemoglobin 13.0 11.7 - 15.7 g/dL    Hematocrit 39.2 35.0 - 47.0 %    MCV 91 78 - 100 fL    MCH 30.0 26.5 - 33.0 pg    MCHC 33.2 31.5 - 36.5 g/dL    RDW 11.9 10.0 - 15.0 %    Platelet Count 235 150 - 450 10e3/uL                 Answers for HPI/ROS submitted by the patient on 6/15/2023  If you checked off any problems, how difficult have these problems made it for you to do your work, take care of things at home, or get along with other people?: Not difficult at all  PHQ9 TOTAL SCORE: 5  JIMMY 7 TOTAL SCORE: 3

## 2023-06-15 NOTE — H&P (VIEW-ONLY)
"  Assessment & Plan     (R10.31) RLQ abdominal pain  (primary encounter diagnosis)  Comment: Work-up in the emergency room negative for any acute findings continues to have intermittent right lower quadrant pain with discolored stools recommend a colonoscopy we will continue to monitor if symptoms worsen patient should be seen based on colonoscopy results will determine further follow-up  Plan: CBC with platelets      (K92.1) Blood in stool  Comment:   Plan: Adult GI  Referral - Procedure Only,         CBC with platelets, Iron and iron binding         capacity, Ferritin            (F29) Psychosis, unspecified psychosis type (H)  Comment: Stable   Plan:  56}   MED REC REQUIRED  Post Medication Reconciliation Status:  Discharge medications reconciled, continue medications without change    BMI:   Estimated body mass index is 28.64 kg/m  as calculated from the following:    Height as of this encounter: 1.549 m (5' 1\").    Weight as of this encounter: 68.8 kg (151 lb 9.6 oz).   Weight management plan: Discussed healthy diet and exercise guidelines    See Patient Instructions    Raissa Garibay, ZBIGNIEW CNP  [facility]    Clinton Child is a 32 year old, presenting for the following health issues:  ER F/U        6/15/2023     8:34 AM   Additional Questions   Roomed by Glo PAZ     ED/UC Followup:    Facility:  [facility]   Date of visit: 6/6/2023  Reason for visit: Abdominal pain  Current Status: alot of discomfort in right side, putting pressure on it hurts, moving a certain way hurts.           Review of Systems   Constitutional, HEENT, cardiovascular, pulmonary, gi and gu systems are negative, except as otherwise noted.      Objective    /62   Pulse 85   Temp 97.8  F (36.6  C) (Tympanic)   Resp 20   Ht 1.549 m (5' 1\")   Wt 68.8 kg (151 lb 9.6 oz)   LMP 06/05/2023 (Exact Date)   SpO2 98%   BMI 28.64 kg/m    Body mass index is 28.64 kg/m .  Physical Exam "   GENERAL: healthy, alert and no distress  EYES: Eyes grossly normal to inspection, PERRL and conjunctivae and sclerae normal  HENT: ear canals and TM's normal, nose and mouth without ulcers or lesions  NECK: no adenopathy, no asymmetry, masses, or scars and thyroid normal to palpation  RESP: lungs clear to auscultation - no rales, rhonchi or wheezes  CV: regular rate and rhythm, normal S1 S2, no S3 or S4, no murmur, click or rub, no peripheral edema and peripheral pulses strong  ABDOMEN: soft, nontender, no hepatosplenomegaly, no masses and bowel sounds normal  MS: no gross musculoskeletal defects noted, no edema  SKIN: no suspicious lesions or rashes  NEURO: Normal strength and tone, mentation intact and speech normal  PSYCH: mentation appears normal, affect normal/bright  Results for orders placed or performed in visit on 06/15/23   CBC with platelets     Status: Normal   Result Value Ref Range    WBC Count 5.8 4.0 - 11.0 10e3/uL    RBC Count 4.33 3.80 - 5.20 10e6/uL    Hemoglobin 13.0 11.7 - 15.7 g/dL    Hematocrit 39.2 35.0 - 47.0 %    MCV 91 78 - 100 fL    MCH 30.0 26.5 - 33.0 pg    MCHC 33.2 31.5 - 36.5 g/dL    RDW 11.9 10.0 - 15.0 %    Platelet Count 235 150 - 450 10e3/uL                 Answers for HPI/ROS submitted by the patient on 6/15/2023  If you checked off any problems, how difficult have these problems made it for you to do your work, take care of things at home, or get along with other people?: Not difficult at all  PHQ9 TOTAL SCORE: 5  JIMMY 7 TOTAL SCORE: 3

## 2023-06-19 ENCOUNTER — TELEPHONE (OUTPATIENT)
Dept: SURGERY | Facility: CLINIC | Age: 33
End: 2023-06-19
Payer: COMMERCIAL

## 2023-06-19 ENCOUNTER — ANESTHESIA EVENT (OUTPATIENT)
Dept: GASTROENTEROLOGY | Facility: CLINIC | Age: 33
End: 2023-06-19
Payer: COMMERCIAL

## 2023-06-19 RX ORDER — BISACODYL 5 MG/1
TABLET, DELAYED RELEASE ORAL
Qty: 4 TABLET | Refills: 0 | Status: SHIPPED | OUTPATIENT
Start: 2023-06-19

## 2023-06-19 NOTE — ANESTHESIA PREPROCEDURE EVALUATION
Anesthesia Pre-Procedure Evaluation    Patient: Danyell Orr   MRN: [id number] : 1990        Procedure : Procedure(s):  Colonoscopy          Past Medical History:   Diagnosis Date     Eczema 1/10/2020     Insomnia 1/10/2020     Major depressive disorder 1/10/2020     Psoriasis 1/10/2020     Tardive dyskinesia 2019      Past Surgical History:   Procedure Laterality Date     ZZC ORAL SURGERY PROCEDURE      wisdom teeth  - had a tooth pulled down      No Known Allergies   Social History     Tobacco Use     Smoking status: Never     Smokeless tobacco: Never   Vaping Use     Vaping status: Not on file   Substance Use Topics     Alcohol use: No      Wt Readings from Last 1 Encounters:   06/15/23 68.8 kg (151 lb 9.6 oz)        Anesthesia Evaluation   Pt has had prior anesthetic. Type: General.        ROS/MED HX  ENT/Pulmonary:       Neurologic: Comment: Tardive dyskinesia      Cardiovascular:       METS/Exercise Tolerance:     Hematologic:       Musculoskeletal: Comment: psoriasis      GI/Hepatic:       Renal/Genitourinary:       Endo: Comment: overweight      Psychiatric/Substance Use:     (+) psychiatric history anxiety, other (comment) and depression     Infectious Disease:       Malignancy:       Other:            Physical Exam    Airway        Mallampati: II   TM distance: > 3 FB   Neck ROM: full   Mouth opening: > 3 cm    Respiratory Devices and Support  Comment: Not on oxygen currently       Dental       (+) Minor Abnormalities - some fillings, tiny chips      Cardiovascular   cardiovascular exam normal          Pulmonary   pulmonary exam normal                OUTSIDE LABS:  CBC:   Lab Results   Component Value Date    WBC 5.8 06/15/2023    WBC 9.0 2021    HGB 13.0 06/15/2023    HGB 13.8 2021    HCT 39.2 06/15/2023    HCT 41.9 2021     06/15/2023     2021     BMP:   Lab Results   Component Value Date     2021    POTASSIUM 3.8 2021    CHLORIDE  103 05/11/2021    CO2 27 05/11/2021    BUN 11 05/11/2021    CR 0.59 05/11/2021     (H) 03/02/2022    GLC 88 01/08/2022     COAGS: No results found for: PTT, INR, FIBR  POC:   Lab Results   Component Value Date    HCG Negative 11/12/2021     HEPATIC: No results found for: ALBUMIN, PROTTOTAL, ALT, AST, GGT, ALKPHOS, BILITOTAL, BILIDIRECT, DOUGLAS  OTHER:   Lab Results   Component Value Date    A1C 5.2 03/02/2022    ERIC 8.3 (L) 05/11/2021    TSH 0.95 05/11/2021       Anesthesia Plan    ASA Status:  2      Anesthesia Type: General.   Induction: Intravenous, Propofol.   Maintenance: TIVA.        Consents    Anesthesia Plan(s) and associated risks, benefits, and realistic alternatives discussed. Questions answered and patient/representative(s) expressed understanding.     - Discussed: Risks, Benefits and Alternatives for BOTH SEDATION and the PROCEDURE were discussed     - Discussed with:  Patient         Postoperative Care    Pain management: IV analgesics, Oral pain medications, Multi-modal analgesia.   PONV prophylaxis: Ondansetron (or other 5HT-3), Dexamethasone or Solumedrol     Comments:    Other Comments: Patient aware of plan, what to expect, and potential risks. Timeout was performed before bringing the patient back to OR, and once again, patient was asked if they had any questions            ZBIGNIEW Martínez CRNA

## 2023-06-20 NOTE — TELEPHONE ENCOUNTER
Screening Questions  BLUE  KIND OF PREP RED  LOCATION [review exclusion criteria] GREEN  SEDATION TYPE        y Are you active on mychart?       Lani Ordering/Referring Provider?        [facility] What type of coverage do you have?      n Have you had a positive covid test in the last 14 days?     28.64 1. BMI  [BMI 40+ - review exclusion criteria& smart-phrase document]    y  2. Are you able to give consent for your medical care? [IF NO,RN REVIEW]          n  3. Are you taking any prescription pain medications on a routine schedule   (ex narcotics: oxycodone, roxicodone, oxycontin,  and percocet)? [RN Review]        n  3a. EXTENDED PREP What kind of prescription?     n 4. Do you have any chemical dependencies such as alcohol, street drugs, or methadone?        **If yes 3- 5 , please schedule with MAC sedation.**          IF YES TO ANY 6 - 10 - HOSPITAL SETTING ONLY.     n 6.   Do you need assistance transferring?     n 7.   Have you had a heart or lung transplant?    n 8.   Are you currently on dialysis?   n 9.   Do you use daily home oxygen?   n 10. Do you take nitroglycerin?   10a. n If yes, how often?     n 11. Are you currently pregnant?    11a. n If yes, how many weeks? [ Greater than 12 weeks, OR NEEDED]    n 12. Do you have Pulmonary Hypertension? *NEED PAC APPT AT [facility] w/ [facility]*     n 13. [review exclusion criteria]  Do you have any implantable devices in your body (pacemaker, defib, LVAD)?    n 14. In the past 6 months, have you had any heart related issues including cardiomyopathy or heart attack?     14a. n If yes, did it require cardiac stenting if so when?     n 15. Have you had a stroke or Transient ischemic attack (TIA - aka  mini stroke ) within 6 months?      n 16. Do you have mod to severe Obstructive Sleep Apnea?  [Hospital only]    n 17. Do you have SEVERE AND UNCONTROLLED asthma? *NEED PAC APPT AT [facility] w/MAC*     18.Do you take blood thinners?  No    n 19. Do you take any of the  "following medications?    nPhentermine    nOzempic    nWegovy (Semaglutide)      19a. If yes, \"Hold for 7 days before procedure.  Please consult your prescribing provider if you have questions about holding this medication.\"     n  20. Do you have chronic kidney disease?      n  21. Do you have a diagnosis of diabetes?     n  22. On a regular basis do you go 3-5 days between bowel movements?     y 23. Preferred [facility] for Pre Prescription         [id number] - [place], [place] - [facility] [address]         - CLOSING REMINDERS -    You will receive a call from a Nurse to review instructions and health history.  This assessment must be completed prior to your procedure.  Failure to complete the Nurse assessment may result in the procedure being cancelled.      On the day of your procedure, please designatean adult(s) who can drive you home stay with you for the next 24 hours. The medicines used in the exam will make you sleepy. You will not be able to drive.      You cannot take public transportation, ride share services, or non-medical taxi service without a responsible caregiver.  Medical transport services are allowed with the requirement that a responsible caregiver will receive you at your destination.  We require that drivers and caregivers are confirmed prior to your procedure.      - SCHEDULING DETAILS -  n & n Hospital Setting Required & If yes, what is the exclusion?   Lewis  Surgeon    6/23/23  Date of Procedure  Lower Endoscopy [Colonoscopy]  Type of Procedure Scheduled  [facility]- If you answer yes to questions #8, #20, #21 [  pts ]Which Colonoscopy Prep was Sent?     general Sedation Type     n PAC / Pre-op Required       " Improved.

## 2023-06-23 ENCOUNTER — ANESTHESIA (OUTPATIENT)
Dept: GASTROENTEROLOGY | Facility: CLINIC | Age: 33
End: 2023-06-23
Payer: COMMERCIAL

## 2023-06-23 ENCOUNTER — HOSPITAL ENCOUNTER (OUTPATIENT)
Facility: CLINIC | Age: 33
Discharge: HOME | End: 2023-06-23
Attending: SURGERY | Admitting: SURGERY
Payer: COMMERCIAL

## 2023-06-23 VITALS
WEIGHT: 150.28 LBS | TEMPERATURE: 97.9 F | DIASTOLIC BLOOD PRESSURE: 71 MMHG | HEART RATE: 62 BPM | OXYGEN SATURATION: 99 % | SYSTOLIC BLOOD PRESSURE: 105 MMHG | HEIGHT: 61 IN | RESPIRATION RATE: 14 BRPM | BODY MASS INDEX: 28.37 KG/M2

## 2023-06-23 DIAGNOSIS — Z12.11 SPECIAL SCREENING FOR MALIGNANT NEOPLASMS, COLON: Primary | ICD-10-CM

## 2023-06-23 LAB — COLONOSCOPY: NORMAL

## 2023-06-23 PROCEDURE — 250N000009 HC RX 250: Performed by: SURGERY

## 2023-06-23 PROCEDURE — 88305 TISSUE EXAM BY PATHOLOGIST: CPT | Mod: 26 | Performed by: PATHOLOGY

## 2023-06-23 PROCEDURE — 370N000017 HC ANESTHESIA TECHNICAL FEE, PER MIN: Performed by: SURGERY

## 2023-06-23 PROCEDURE — 88305 TISSUE EXAM BY PATHOLOGIST: CPT | Mod: TC | Performed by: SURGERY

## 2023-06-23 PROCEDURE — 45378 DIAGNOSTIC COLONOSCOPY: CPT | Performed by: SURGERY

## 2023-06-23 PROCEDURE — 45380 COLONOSCOPY AND BIOPSY: CPT | Performed by: SURGERY

## 2023-06-23 PROCEDURE — 258N000003 HC RX IP 258 OP 636: Performed by: SURGERY

## 2023-06-23 PROCEDURE — 250N000011 HC RX IP 250 OP 636

## 2023-06-23 RX ORDER — ONDANSETRON 4 MG/1
4 TABLET, ORALLY DISINTEGRATING ORAL EVERY 30 MIN PRN
Status: DISCONTINUED | OUTPATIENT
Start: 2023-06-23 | End: 2023-06-23 | Stop reason: HOSPADM

## 2023-06-23 RX ORDER — ONDANSETRON 2 MG/ML
4 INJECTION INTRAMUSCULAR; INTRAVENOUS EVERY 30 MIN PRN
Status: DISCONTINUED | OUTPATIENT
Start: 2023-06-23 | End: 2023-06-23 | Stop reason: HOSPADM

## 2023-06-23 RX ORDER — LIDOCAINE 40 MG/G
CREAM TOPICAL
Status: DISCONTINUED | OUTPATIENT
Start: 2023-06-23 | End: 2023-06-23 | Stop reason: HOSPADM

## 2023-06-23 RX ORDER — SODIUM CHLORIDE, SODIUM LACTATE, POTASSIUM CHLORIDE, CALCIUM CHLORIDE 600; 310; 30; 20 MG/100ML; MG/100ML; MG/100ML; MG/100ML
INJECTION, SOLUTION INTRAVENOUS CONTINUOUS
Status: DISCONTINUED | OUTPATIENT
Start: 2023-06-23 | End: 2023-06-23 | Stop reason: HOSPADM

## 2023-06-23 RX ORDER — ALBUTEROL SULFATE 0.83 MG/ML
2.5 SOLUTION RESPIRATORY (INHALATION) EVERY 4 HOURS PRN
Status: DISCONTINUED | OUTPATIENT
Start: 2023-06-23 | End: 2023-06-23 | Stop reason: HOSPADM

## 2023-06-23 RX ORDER — PROPOFOL 10 MG/ML
INJECTION, EMULSION INTRAVENOUS PRN
Status: DISCONTINUED | OUTPATIENT
Start: 2023-06-23 | End: 2023-06-23

## 2023-06-23 RX ADMIN — PROPOFOL 100 MG: 10 INJECTION, EMULSION INTRAVENOUS at 08:23

## 2023-06-23 RX ADMIN — PROPOFOL 200 MCG/KG/MIN: 10 INJECTION, EMULSION INTRAVENOUS at 08:25

## 2023-06-23 RX ADMIN — LIDOCAINE HYDROCHLORIDE 0.1 ML: 10 INJECTION, SOLUTION EPIDURAL; INFILTRATION; INTRACAUDAL; PERINEURAL at 07:48

## 2023-06-23 RX ADMIN — SODIUM CHLORIDE, POTASSIUM CHLORIDE, SODIUM LACTATE AND CALCIUM CHLORIDE: 600; 310; 30; 20 INJECTION, SOLUTION INTRAVENOUS at 07:48

## 2023-06-23 ASSESSMENT — ACTIVITIES OF DAILY LIVING (ADL)
ADLS_ACUITY_SCORE: 35
ADLS_ACUITY_SCORE: 35

## 2023-06-23 NOTE — ANESTHESIA CARE TRANSFER NOTE
Patient: Danyell Orr    Procedure: Procedure(s):  Colonoscopy with polypectomy         Diagnosis: Blood in stool [K92.1]  Diagnosis Additional Information: No value filed.    Anesthesia Type:   General     Note:    Oropharynx: oropharynx clear of all foreign objects  Level of Consciousness: awake      Independent Airway: airway patency satisfactory and stable  Dentition: dentition unchanged  Vital Signs Stable: post-procedure vital signs reviewed and stable  Report to RN Given: handoff report given  Patient transferred to: Phase II    Handoff Report: Identifed the Patient, Identified the Reponsible Provider, Reviewed the pertinent medical history, Discussed the surgical course, Reviewed Intra-OP anesthesia mangement and issues during anesthesia, Set expectations for post-procedure period and Allowed opportunity for questions and acknowledgement of understanding      Vitals:  Vitals Value Taken Time   BP 92/51 06/23/23 0900   Temp     Pulse 60 06/23/23 0900   Resp 14 06/23/23 0845   SpO2 98 % 06/23/23 0908   Vitals shown include unvalidated device data.    Electronically Signed By: ZBIGNIEW Oglesby CRNA  June 23, 2023  9:09 AM

## 2023-06-23 NOTE — INTERVAL H&P NOTE
"I have reviewed the surgical (or preoperative) H&P that is linked to this encounter, and examined the patient. There are no significant changes    1st colon; RLQ pain and blood in stool; nl CT; no blood thinner; no famhx of colon cancer    Clinical Conditions Present on Arrival:  Clinically Significant Risk Factors Present on Admission                  # Overweight: Estimated body mass index is 28.41 kg/m  as calculated from the following:    Height as of this encounter: 1.549 m (5' 0.98\").    Weight as of this encounter: 68.2 kg (150 lb 4.4 oz).       "

## 2023-06-23 NOTE — LETTER
Danyell Orr  [address]  [place] [address]    July 4, 2023    Dear Danyell,  This letter is written to inform you of the results of your recent colonoscopy.  Your examination showed polyp(s) in your transverse colon. All polyps were removed in their entirety and sent for review by a pathologist. As you will see on the pathology report below, the tissue(s) were normal colon tissue. Your examination was otherwise without abnormality.    Final Diagnosis   Colon, transverse: Polypectomy:  - Non-dysplastic colonic mucosa   - Multiple additional levels examined         Given these findings, I recommend that you follow-up with your primary care provider or gastroenterologist, to discuss further work-up.  As for your screening colonoscopy; please get your next one at 45 yo.      Please remember that this recommendation is made with the understanding that you are not experiencing persistent changes in bowel function, bleeding per rectum, and/or significant abdominal pain. If you experience these symptoms, please contact your primary care provider for a further evaluation.     If you have any questions or concerns about the results of your colonoscopy or the appropriate follow-up, please contact my assistant at [phone].    Sincerely,        [facility] FAC[facility]  ___

## 2023-06-23 NOTE — ANESTHESIA POSTPROCEDURE EVALUATION
Patient: Danyell Orr    Procedure: Procedure(s):  Colonoscopy with polypectomy         Anesthesia Type:  General    Note:  Disposition: Outpatient   Postop Pain Control: Uneventful            Sign Out: Well controlled pain   PONV: No   Neuro/Psych: Uneventful            Sign Out: Acceptable/Baseline neuro status   Airway/Respiratory: Uneventful            Sign Out: Acceptable/Baseline resp. status   CV/Hemodynamics: Uneventful            Sign Out: Acceptable CV status; No obvious hypovolemia; No obvious fluid overload   Other NRE:    DID A NON-ROUTINE EVENT OCCUR?            Last vitals:  Vitals Value Taken Time   BP 92/51 06/23/23 0900   Temp     Pulse 60 06/23/23 0900   Resp 14 06/23/23 0845   SpO2 98 % 06/23/23 0908   Vitals shown include unvalidated device data.    Electronically Signed By: ZBIGNIEW Oglesby CRNA  June 23, 2023  9:09 AM

## 2023-06-27 LAB
PATH REPORT.COMMENTS IMP SPEC: NORMAL
PATH REPORT.COMMENTS IMP SPEC: NORMAL
PATH REPORT.FINAL DX SPEC: NORMAL
PATH REPORT.GROSS SPEC: NORMAL
PATH REPORT.MICROSCOPIC SPEC OTHER STN: NORMAL
PATH REPORT.RELEVANT HX SPEC: NORMAL
PHOTO IMAGE: NORMAL

## 2023-07-02 ENCOUNTER — HEALTH MAINTENANCE LETTER (OUTPATIENT)
Age: 33
End: 2023-07-02

## 2023-09-29 NOTE — PROGRESS NOTES
"  Psychiatry Clinic Progress Note                                                                   Danyell Orr is a 29 year old female who returns to the clinic for follow-up care  Therapist: None  PCP: Raissa Garibay  Other Providers: None    Pertinent Background:  See previous notes.  Psych critical item history includes trauma hx.     Interim History                                                                                                        4, 4     The patient is a good historian, reports good treatment adherence and was last seen 4/3/20.  Since the last visit, Danyell reports the increase in Prozac was helpful as it decreased irritability, improved depression, and decreased anxiety.  No concerns with side effects.  No concerns with sleep.  Continues to socialize with friend fairly regularly.      4/3/20: Danyell reports she's doing as \"good as I can now.\" Stress and anxiety have increased due Covid 19 pandemic.  She misses socializing with her friends.  No concerns with uncontrollable blinking that was issue last fall.  Sleep is not a concern.  She is requesting an increase in antidepressant.      1/16/20: Danyell reports she is doing \"well.\"  Danyell does not endorse concern regarding excessively blinking which was an issue in October.  Stopped since Abilify discontinued.  Sleep is \"ok.\"  Continues to socialize with friends.  Saw neurologist on 1/10/19.  Overall, Danyell is doing really well.      11/22/19: Danyell reports blinking has stopped after Abilify was discontinued.  No blinking observed during appointment.  She is unsure how many days after discontinuation the symptoms subsided.  Danyell has not reported any worsening of mental health symptoms.  Affect bright and appropriately talkative.  Sleep \"ok\" but not a concern.  Adderall continues to be helpful.      8/30/19: Danyell reports she continues to do well.  Very bright affect and appropriately talkative.  No concerns with symptoms.  Continues to spend time " "socializing with friends.  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  Adderall continues to be effective with management of attention. No concerns with sleep, anxiety, and appetite suppression.        6/14/19: Danyell reports she is \"good!\"  Affect quite bright today and talkative.  Again reports no significant changes.  She talks with her brother frequently.  Danyell reports he sleep did improve after changing when she takes her Abilify and Prozac from bedtime to morning.      3/22/19: Danyell reports she is doing \"good.\"  No significant changes since last visit.  Her brother, Onesimo, moved to [place] and she reports she is managing change.  She does report difficulty staying asleep and is probably attributable to her taking Prozac and Abilify at bedtime.  Advised her to take in the morning.      Recent Symptoms:   Depression:  insomnia and not endorsed  Elevated:  none  Psychosis:  none  Anxiety:  occasional worry  Panic Attack:  none  Trauma Related:  none     Recent Substance Use:  Alcohol- no , Tobacco- no , Caffeine- coffee/ tea [1 cup], Opioids- no    Narcan Kit- N/A , Cannabis- no  and Other Illicit Drugs-none        Social/ Family History                                  [per patient report]                                 1ea,1ea   FINANCIAL SUPPORT- not working.  Has not worked since graduating from high school    CHILDREN- None       LIVING SITUATION- Lives with mother and stepfather in [place], [place]      LEGAL- None  EARLY HISTORY/ EDUCATION- Grew up in [place], [place].  Graduated from high school.  Received special education accommodations while in high school  SOCIAL/ SPIRITUAL SUPPORT- close friends and brother       TRAUMA HISTORY (self-report)- None  FEELS SAFE AT HOME- Yes  FAMILY HISTORY-  unknown    Medical / Surgical History                                                                                                                  Patient Active Problem List   Diagnosis     Anxiety     " Tardive dyskinesia     Major depressive disorder     Eczema     Insomnia     Psoriasis       Past Surgical History:   Procedure Laterality Date     C ORAL SURGERY PROCEDURE      wisdom teeth  - had a tooth pulled down        Medical Review of Systems                                                                                                    2,10   The remainder of the review of systems is noncontributory  Allergy                                Patient has no known allergies.  Current Medications                                                                                                       Current Outpatient Medications   Medication Sig Dispense Refill     amphetamine-dextroamphetamine (ADDERALL XR) 20 MG 24 hr capsule Take 2 capsules (40 mg) by mouth daily 60 capsule 0     amphetamine-dextroamphetamine (ADDERALL XR) 20 MG 24 hr capsule Take 2 capsules (40 mg) by mouth daily 60 capsule 0     desogestrel-ethinyl estradiol (JULEBER) 0.15-30 MG-MCG tablet Take 1 tablet by mouth daily 84 tablet 3     FLUoxetine (PROZAC) 10 MG capsule Take 1 capsule (10 mg) by mouth daily In addition to 20mg capsule for total daily dose of 30mg 30 capsule 0     FLUoxetine (PROZAC) 20 MG capsule Take 1 capsule (20 mg) by mouth daily In addition to 10mg capsule for total daily dose of 30mg 30 capsule 0     ibuprofen (ADVIL/MOTRIN) 600 MG tablet Take 600 mg by mouth every 6 hours as needed        PSYLLIUM PO Take 1 tsp. by mouth daily as needed        Vitals                                                                                                                       3, 3   There were no vitals taken for this visit.   Mental Status Exam                                                                                    9, 14 cog gs     Alertness: alert  and oriented  Appearance: casually groomed  Behavior/Demeanor: cooperative and pleasant, with fair  eye contact   Speech: normal  Language: no problems  Psychomotor:  Vaccine status unknown normal or unremarkable  Mood: description consistent with euthymia  Affect: appropriate; was congruent to mood; was congruent to content  Thought Process/Associations: unremarkable  Thought Content:  Reports none;  Denies suicidal ideation and violent ideation  Perception:  Reports none;  Denies auditory hallucinations and visual hallucinations  Insight: adequate  Judgment: good  Cognition: (6) does  appear grossly intact; formal cognitive testing was not done  Gait/Station and/or Muscle Strength/Tone: unable to assess    Labs and Data                                                                                                                 Rating Scales:    PHQ9    PHQ9 Today:  Not completed    PHQ-9 SCORE 3/22/2019 6/14/2019 8/30/2019   PHQ-9 Total Score 4 1 0         Diagnosis and Assessment                                                                             m2, h3     Today the following issues were addressed:    1) Major Depressive Disorder, recurrent, full remission  2) Autism Spectrum Disorder (Hx)     [place] Prescription Monitoring Program [] was checked today:  indicates Danyell has been regularly filling Adderall XR 20mg #60 for past 12 months.       Plan                                                                                                                    m2, h3      1) Medication Management  Continue Adderall XR 40mg daily  Continue Prozac 30mg daily    2) Referral   Neurology if EPSE symptoms re-emerge     RTC: 3 months  CRISIS NUMBERS:   Provided routinely in AVS.    Treatment Risk Statement:  The patient understands the risks, benefits, adverse effects and alternatives. Agrees to treatment with the capacity to do so. No medical contraindications to treatment. Agrees to call clinic for any problems. The patient understands to call 911 or go to the nearest ED if life threatening or urgent symptoms occur.      PROVIDER:  ZBIGNIEW Murphy CNP    VIDEO VISIT  Danyell Orr is a  "29 year old patient who is being evaluated via a billable video visit.      The patient has been notified of following:   \"We have found that certain health care needs can be provided without the need for an in-person physical exam. This service lets us provide the care you need with a video conversation. If a prescription is necessary we can send it directly to your pharmacy. If lab work is needed we can place an order for that and you can then stop by our lab to have the test done at a later time. Insurers are generally covering virtual visits as they would in-office visits so billing should not be different than normal.  If for some reason you do get billed incorrectly, you should contact the billing office to correct it and that number is in the AVS .    Patient has given verbal consent for video visit?: Yes   How would you like to obtain your AVS?: not requested  AVS SmartPhrase [PsychAVS] has been placed in 'Patient Instructions': unknown      Video- Visit Details  Type of service:  video visit for medication management  Time of service:    Date:  06/03/2020    Video Start Time:  10:33am        Video End Time:  10:46am    Reason for video visit:  Patient unable to travel due to Covid-19  Originating Site (patient location):  Patient's home  Distant Site (provider location):  Remote location  Mode of Communication:  Video Conference via [email or web address]          VIDEO VISIT  Danyell Orr is a 29 year old patient who is being evaluated via a billable video visit.      The patient has been notified of following:   \"This video visit will be conducted via a call between you and your physician/provider. We have found that certain health care needs can be provided without the need for an in-person physical exam. This service lets us provide the care you need with a video conversation. If a prescription is necessary we can send it directly to your pharmacy. If lab work is needed we can place an order for that and you can " then stop by our lab to have the test done at a later time. Insurers are generally covering virtual visits as they would in-office visits so billing should not be different than normal.  If for some reason you do get billed incorrectly, you should contact the billing office to correct it and that number is in the AVS .    Patient has given verbal consent for video visit?:  Yes     How would you like to obtain your AVS?:  Weilos    Video invitation for patient:  DOXY provider, invite not needed      AVS SmartPhrase [PsychAVS] has been placed in 'Patient Instructions':  Yes

## 2024-03-12 DIAGNOSIS — Z79.899 HIGH RISK MEDICATION USE: Primary | ICD-10-CM

## 2025-05-31 ENCOUNTER — HEALTH MAINTENANCE LETTER (OUTPATIENT)
Age: 35
End: 2025-05-31

## (undated) RX ORDER — PROPOFOL 10 MG/ML
INJECTION, EMULSION INTRAVENOUS
Status: DISPENSED
Start: 2023-06-23